# Patient Record
Sex: FEMALE | Race: WHITE | NOT HISPANIC OR LATINO | Employment: OTHER | URBAN - NONMETROPOLITAN AREA
[De-identification: names, ages, dates, MRNs, and addresses within clinical notes are randomized per-mention and may not be internally consistent; named-entity substitution may affect disease eponyms.]

---

## 2017-10-26 ENCOUNTER — HOSPITAL ENCOUNTER (OUTPATIENT)
Dept: ULTRASOUND IMAGING | Facility: HOSPITAL | Age: 77
Discharge: HOME | End: 2017-10-26
Admitting: INTERNAL MEDICINE

## 2017-10-26 DIAGNOSIS — R94.4 ABNORMAL RESULTS OF KIDNEY FUNCTION STUDIES: ICD-10-CM

## 2017-10-26 PROCEDURE — 76775 US EXAM ABDO BACK WALL LIM: CPT

## 2021-01-01 ENCOUNTER — IMMUNIZATION (OUTPATIENT)
Dept: VACCINE CLINIC | Facility: HOSPITAL | Age: 81
End: 2021-01-01

## 2021-01-01 ENCOUNTER — APPOINTMENT (OUTPATIENT)
Dept: CT IMAGING | Facility: HOSPITAL | Age: 81
End: 2021-01-01

## 2021-01-01 ENCOUNTER — APPOINTMENT (OUTPATIENT)
Dept: GENERAL RADIOLOGY | Facility: HOSPITAL | Age: 81
End: 2021-01-01

## 2021-01-01 ENCOUNTER — HOSPITAL ENCOUNTER (INPATIENT)
Facility: HOSPITAL | Age: 81
LOS: 3 days | Discharge: NURSING, REHAB OR LONG-TERM CARE | End: 2021-06-24
Attending: STUDENT IN AN ORGANIZED HEALTH CARE EDUCATION/TRAINING PROGRAM | Admitting: INTERNAL MEDICINE

## 2021-01-01 ENCOUNTER — LAB REQUISITION (OUTPATIENT)
Dept: LAB | Facility: HOSPITAL | Age: 81
End: 2021-01-01

## 2021-01-01 VITALS
OXYGEN SATURATION: 98 % | WEIGHT: 127.8 LBS | HEART RATE: 76 BPM | HEIGHT: 65 IN | SYSTOLIC BLOOD PRESSURE: 124 MMHG | DIASTOLIC BLOOD PRESSURE: 65 MMHG | RESPIRATION RATE: 18 BRPM | TEMPERATURE: 97 F | BODY MASS INDEX: 21.29 KG/M2

## 2021-01-01 DIAGNOSIS — R41.0 CONFUSION: ICD-10-CM

## 2021-01-01 DIAGNOSIS — Z74.09 IMPAIRED MOBILITY AND ADLS: ICD-10-CM

## 2021-01-01 DIAGNOSIS — Z00.00 ENCOUNTER FOR GENERAL ADULT MEDICAL EXAMINATION WITHOUT ABNORMAL FINDINGS: ICD-10-CM

## 2021-01-01 DIAGNOSIS — Z74.09 IMPAIRED FUNCTIONAL MOBILITY, BALANCE, GAIT, AND ENDURANCE: ICD-10-CM

## 2021-01-01 DIAGNOSIS — E87.1 HYPONATREMIA: ICD-10-CM

## 2021-01-01 DIAGNOSIS — Z78.9 IMPAIRED MOBILITY AND ADLS: ICD-10-CM

## 2021-01-01 DIAGNOSIS — R91.8 LUNG MASS: ICD-10-CM

## 2021-01-01 DIAGNOSIS — E83.42 HYPOMAGNESEMIA: Primary | ICD-10-CM

## 2021-01-01 LAB
ALBUMIN SERPL-MCNC: 3.7 G/DL (ref 3.5–5.2)
ALBUMIN/GLOB SERPL: 1.2 G/DL
ALP SERPL-CCNC: 65 U/L (ref 39–117)
ALT SERPL W P-5'-P-CCNC: 11 U/L (ref 1–33)
ANION GAP SERPL CALCULATED.3IONS-SCNC: 10 MMOL/L (ref 5–15)
ANION GAP SERPL CALCULATED.3IONS-SCNC: 12 MMOL/L (ref 5–15)
ANION GAP SERPL CALCULATED.3IONS-SCNC: 14 MMOL/L (ref 5–15)
ANION GAP SERPL CALCULATED.3IONS-SCNC: 8 MMOL/L (ref 5–15)
AST SERPL-CCNC: 17 U/L (ref 1–32)
BACTERIA UR QL AUTO: ABNORMAL /HPF
BASOPHILS # BLD AUTO: 0.05 10*3/MM3 (ref 0–0.2)
BASOPHILS # BLD AUTO: 0.07 10*3/MM3 (ref 0–0.2)
BASOPHILS NFR BLD AUTO: 0.5 % (ref 0–1.5)
BASOPHILS NFR BLD AUTO: 0.6 % (ref 0–1.5)
BASOPHILS NFR BLD AUTO: 0.7 % (ref 0–1.5)
BASOPHILS NFR BLD AUTO: 0.7 % (ref 0–1.5)
BILIRUB SERPL-MCNC: 0.8 MG/DL (ref 0–1.2)
BILIRUB UR QL STRIP: ABNORMAL
BUN SERPL-MCNC: 10 MG/DL (ref 8–23)
BUN SERPL-MCNC: 11 MG/DL (ref 8–23)
BUN SERPL-MCNC: 11 MG/DL (ref 8–23)
BUN SERPL-MCNC: 6 MG/DL (ref 8–23)
BUN/CREAT SERPL: 11.8 (ref 7–25)
BUN/CREAT SERPL: 13.3 (ref 7–25)
BUN/CREAT SERPL: 13.3 (ref 7–25)
BUN/CREAT SERPL: 8.8 (ref 7–25)
CALCIUM SPEC-SCNC: 8.2 MG/DL (ref 8.6–10.5)
CALCIUM SPEC-SCNC: 8.4 MG/DL (ref 8.6–10.5)
CALCIUM SPEC-SCNC: 9.1 MG/DL (ref 8.6–10.5)
CALCIUM SPEC-SCNC: 9.2 MG/DL (ref 8.6–10.5)
CHLORIDE SERPL-SCNC: 80 MMOL/L (ref 98–107)
CHLORIDE SERPL-SCNC: 80 MMOL/L (ref 98–107)
CHLORIDE SERPL-SCNC: 81 MMOL/L (ref 98–107)
CHLORIDE SERPL-SCNC: 85 MMOL/L (ref 98–107)
CLARITY UR: CLEAR
CO2 SERPL-SCNC: 25 MMOL/L (ref 22–29)
CO2 SERPL-SCNC: 26 MMOL/L (ref 22–29)
CO2 SERPL-SCNC: 27 MMOL/L (ref 22–29)
CO2 SERPL-SCNC: 27 MMOL/L (ref 22–29)
COLOR UR: ABNORMAL
CREAT SERPL-MCNC: 0.68 MG/DL (ref 0.57–1)
CREAT SERPL-MCNC: 0.83 MG/DL (ref 0.57–1)
CREAT SERPL-MCNC: 0.83 MG/DL (ref 0.57–1)
CREAT SERPL-MCNC: 0.85 MG/DL (ref 0.57–1)
CYTO UR: NORMAL
CYTO UR: NORMAL
DEPRECATED RDW RBC AUTO: 38.4 FL (ref 37–54)
DEPRECATED RDW RBC AUTO: 38.5 FL (ref 37–54)
DEPRECATED RDW RBC AUTO: 39.2 FL (ref 37–54)
DEPRECATED RDW RBC AUTO: 40.8 FL (ref 37–54)
EOSINOPHIL # BLD AUTO: 0 10*3/MM3 (ref 0–0.4)
EOSINOPHIL # BLD AUTO: 0.02 10*3/MM3 (ref 0–0.4)
EOSINOPHIL # BLD AUTO: 0.07 10*3/MM3 (ref 0–0.4)
EOSINOPHIL # BLD AUTO: 0.2 10*3/MM3 (ref 0–0.4)
EOSINOPHIL NFR BLD AUTO: 0 % (ref 0.3–6.2)
EOSINOPHIL NFR BLD AUTO: 0.2 % (ref 0.3–6.2)
EOSINOPHIL NFR BLD AUTO: 0.9 % (ref 0.3–6.2)
EOSINOPHIL NFR BLD AUTO: 2.1 % (ref 0.3–6.2)
ERYTHROCYTE [DISTWIDTH] IN BLOOD BY AUTOMATED COUNT: 11.9 % (ref 12.3–15.4)
ERYTHROCYTE [DISTWIDTH] IN BLOOD BY AUTOMATED COUNT: 12 % (ref 12.3–15.4)
ERYTHROCYTE [DISTWIDTH] IN BLOOD BY AUTOMATED COUNT: 12.1 % (ref 12.3–15.4)
ERYTHROCYTE [DISTWIDTH] IN BLOOD BY AUTOMATED COUNT: 12.2 % (ref 12.3–15.4)
FLUAV RNA RESP QL NAA+PROBE: NOT DETECTED
FLUBV RNA RESP QL NAA+PROBE: NOT DETECTED
GFR SERPL CREATININE-BSD FRML MDRD: 64 ML/MIN/1.73
GFR SERPL CREATININE-BSD FRML MDRD: 66 ML/MIN/1.73
GFR SERPL CREATININE-BSD FRML MDRD: 66 ML/MIN/1.73
GFR SERPL CREATININE-BSD FRML MDRD: 83 ML/MIN/1.73
GLOBULIN UR ELPH-MCNC: 3 GM/DL
GLUCOSE BLDC GLUCOMTR-MCNC: 109 MG/DL (ref 70–130)
GLUCOSE BLDC GLUCOMTR-MCNC: 112 MG/DL (ref 70–130)
GLUCOSE BLDC GLUCOMTR-MCNC: 126 MG/DL (ref 70–130)
GLUCOSE BLDC GLUCOMTR-MCNC: 129 MG/DL (ref 70–130)
GLUCOSE BLDC GLUCOMTR-MCNC: 137 MG/DL (ref 70–130)
GLUCOSE BLDC GLUCOMTR-MCNC: 152 MG/DL (ref 70–130)
GLUCOSE BLDC GLUCOMTR-MCNC: 171 MG/DL (ref 70–130)
GLUCOSE BLDC GLUCOMTR-MCNC: 202 MG/DL (ref 70–130)
GLUCOSE BLDC GLUCOMTR-MCNC: 256 MG/DL (ref 70–130)
GLUCOSE SERPL-MCNC: 127 MG/DL (ref 65–99)
GLUCOSE SERPL-MCNC: 134 MG/DL (ref 65–99)
GLUCOSE SERPL-MCNC: 160 MG/DL (ref 65–99)
GLUCOSE SERPL-MCNC: 178 MG/DL (ref 65–99)
GLUCOSE UR STRIP-MCNC: NEGATIVE MG/DL
HBA1C MFR BLD: 5.9 % (ref 4.8–5.6)
HCT VFR BLD AUTO: 32.1 % (ref 34–46.6)
HCT VFR BLD AUTO: 34 % (ref 34–46.6)
HCT VFR BLD AUTO: 37 % (ref 34–46.6)
HCT VFR BLD AUTO: 37.8 % (ref 34–46.6)
HGB BLD-MCNC: 11.7 G/DL (ref 12–15.9)
HGB BLD-MCNC: 12 G/DL (ref 12–15.9)
HGB BLD-MCNC: 13.5 G/DL (ref 12–15.9)
HGB BLD-MCNC: 13.6 G/DL (ref 12–15.9)
HGB UR QL STRIP.AUTO: NEGATIVE
HOLD SPECIMEN: NORMAL
HYALINE CASTS UR QL AUTO: ABNORMAL /LPF
IMM GRANULOCYTES # BLD AUTO: 0.02 10*3/MM3 (ref 0–0.05)
IMM GRANULOCYTES # BLD AUTO: 0.03 10*3/MM3 (ref 0–0.05)
IMM GRANULOCYTES # BLD AUTO: 0.03 10*3/MM3 (ref 0–0.05)
IMM GRANULOCYTES # BLD AUTO: 0.04 10*3/MM3 (ref 0–0.05)
IMM GRANULOCYTES NFR BLD AUTO: 0.2 % (ref 0–0.5)
IMM GRANULOCYTES NFR BLD AUTO: 0.3 % (ref 0–0.5)
IMM GRANULOCYTES NFR BLD AUTO: 0.4 % (ref 0–0.5)
IMM GRANULOCYTES NFR BLD AUTO: 0.4 % (ref 0–0.5)
KETONES UR QL STRIP: ABNORMAL
LAB AP CASE REPORT: NORMAL
LAB AP CASE REPORT: NORMAL
LEUKOCYTE ESTERASE UR QL STRIP.AUTO: NEGATIVE
LYMPHOCYTES # BLD AUTO: 1.17 10*3/MM3 (ref 0.7–3.1)
LYMPHOCYTES # BLD AUTO: 1.18 10*3/MM3 (ref 0.7–3.1)
LYMPHOCYTES # BLD AUTO: 2.01 10*3/MM3 (ref 0.7–3.1)
LYMPHOCYTES # BLD AUTO: 2.32 10*3/MM3 (ref 0.7–3.1)
LYMPHOCYTES NFR BLD AUTO: 13 % (ref 19.6–45.3)
LYMPHOCYTES NFR BLD AUTO: 15.5 % (ref 19.6–45.3)
LYMPHOCYTES NFR BLD AUTO: 19.4 % (ref 19.6–45.3)
LYMPHOCYTES NFR BLD AUTO: 24.5 % (ref 19.6–45.3)
Lab: NORMAL
MAGNESIUM SERPL-MCNC: 0.9 MG/DL (ref 1.6–2.4)
MAGNESIUM SERPL-MCNC: 2.9 MG/DL (ref 1.6–2.4)
MCH RBC QN AUTO: 32.2 PG (ref 26.6–33)
MCH RBC QN AUTO: 32.3 PG (ref 26.6–33)
MCH RBC QN AUTO: 32.5 PG (ref 26.6–33)
MCH RBC QN AUTO: 32.5 PG (ref 26.6–33)
MCHC RBC AUTO-ENTMCNC: 35.3 G/DL (ref 31.5–35.7)
MCHC RBC AUTO-ENTMCNC: 36 G/DL (ref 31.5–35.7)
MCHC RBC AUTO-ENTMCNC: 36.4 G/DL (ref 31.5–35.7)
MCHC RBC AUTO-ENTMCNC: 36.5 G/DL (ref 31.5–35.7)
MCV RBC AUTO: 88.9 FL (ref 79–97)
MCV RBC AUTO: 89.2 FL (ref 79–97)
MCV RBC AUTO: 89.4 FL (ref 79–97)
MCV RBC AUTO: 91.4 FL (ref 79–97)
MONOCYTES # BLD AUTO: 0.48 10*3/MM3 (ref 0.1–0.9)
MONOCYTES # BLD AUTO: 0.61 10*3/MM3 (ref 0.1–0.9)
MONOCYTES # BLD AUTO: 0.7 10*3/MM3 (ref 0.1–0.9)
MONOCYTES # BLD AUTO: 0.75 10*3/MM3 (ref 0.1–0.9)
MONOCYTES NFR BLD AUTO: 6.3 % (ref 5–12)
MONOCYTES NFR BLD AUTO: 6.8 % (ref 5–12)
MONOCYTES NFR BLD AUTO: 7.2 % (ref 5–12)
MONOCYTES NFR BLD AUTO: 7.4 % (ref 5–12)
NEUTROPHILS NFR BLD AUTO: 5.82 10*3/MM3 (ref 1.7–7)
NEUTROPHILS NFR BLD AUTO: 6.16 10*3/MM3 (ref 1.7–7)
NEUTROPHILS NFR BLD AUTO: 65 % (ref 42.7–76)
NEUTROPHILS NFR BLD AUTO: 7.18 10*3/MM3 (ref 1.7–7)
NEUTROPHILS NFR BLD AUTO: 7.49 10*3/MM3 (ref 1.7–7)
NEUTROPHILS NFR BLD AUTO: 72.3 % (ref 42.7–76)
NEUTROPHILS NFR BLD AUTO: 76.2 % (ref 42.7–76)
NEUTROPHILS NFR BLD AUTO: 79.4 % (ref 42.7–76)
NITRITE UR QL STRIP: NEGATIVE
NRBC BLD AUTO-RTO: 0 /100 WBC (ref 0–0.2)
OSMOLALITY SERPL: 252 MOSM/KG (ref 280–301)
OSMOLALITY UR: 605 MOSM/KG (ref 38–1400)
PATH REPORT.FINAL DX SPEC: NORMAL
PATH REPORT.FINAL DX SPEC: NORMAL
PATH REPORT.GROSS SPEC: NORMAL
PATH REPORT.GROSS SPEC: NORMAL
PH UR STRIP.AUTO: 6 [PH] (ref 5–9)
PLATELET # BLD AUTO: 319 10*3/MM3 (ref 140–450)
PLATELET # BLD AUTO: 352 10*3/MM3 (ref 140–450)
PLATELET # BLD AUTO: 362 10*3/MM3 (ref 140–450)
PLATELET # BLD AUTO: 397 10*3/MM3 (ref 140–450)
PMV BLD AUTO: 9.1 FL (ref 6–12)
PMV BLD AUTO: 9.2 FL (ref 6–12)
PMV BLD AUTO: 9.3 FL (ref 6–12)
PMV BLD AUTO: 9.6 FL (ref 6–12)
POTASSIUM SERPL-SCNC: 3.3 MMOL/L (ref 3.5–5.2)
POTASSIUM SERPL-SCNC: 3.4 MMOL/L (ref 3.5–5.2)
POTASSIUM SERPL-SCNC: 3.4 MMOL/L (ref 3.5–5.2)
POTASSIUM SERPL-SCNC: 3.7 MMOL/L (ref 3.5–5.2)
POTASSIUM SERPL-SCNC: 4 MMOL/L (ref 3.5–5.2)
POTASSIUM SERPL-SCNC: 4.8 MMOL/L (ref 3.5–5.2)
PROT SERPL-MCNC: 6.7 G/DL (ref 6–8.5)
PROT UR QL STRIP: ABNORMAL
RBC # BLD AUTO: 3.6 10*6/MM3 (ref 3.77–5.28)
RBC # BLD AUTO: 3.72 10*6/MM3 (ref 3.77–5.28)
RBC # BLD AUTO: 4.16 10*6/MM3 (ref 3.77–5.28)
RBC # BLD AUTO: 4.23 10*6/MM3 (ref 3.77–5.28)
RBC # UR: ABNORMAL /HPF
REF LAB TEST METHOD: ABNORMAL
SARS-COV-2 RNA RESP QL NAA+PROBE: NOT DETECTED
SARS-COV-2 RNA RESP QL NAA+PROBE: NOT DETECTED
SODIUM SERPL-SCNC: 115 MMOL/L (ref 136–145)
SODIUM SERPL-SCNC: 118 MMOL/L (ref 136–145)
SODIUM SERPL-SCNC: 119 MMOL/L (ref 136–145)
SODIUM SERPL-SCNC: 120 MMOL/L (ref 136–145)
SODIUM UR-SCNC: 27 MMOL/L
SP GR UR STRIP: 1.02 (ref 1–1.03)
SQUAMOUS #/AREA URNS HPF: ABNORMAL /HPF
TSH SERPL DL<=0.05 MIU/L-ACNC: 1.92 UIU/ML (ref 0.27–4.2)
URATE SERPL-MCNC: 3.3 MG/DL (ref 2.4–5.7)
UROBILINOGEN UR QL STRIP: ABNORMAL
WBC # BLD AUTO: 10.36 10*3/MM3 (ref 3.4–10.8)
WBC # BLD AUTO: 7.63 10*3/MM3 (ref 3.4–10.8)
WBC # BLD AUTO: 9.03 10*3/MM3 (ref 3.4–10.8)
WBC # BLD AUTO: 9.48 10*3/MM3 (ref 3.4–10.8)
WBC UR QL AUTO: ABNORMAL /HPF
WHOLE BLOOD HOLD SPECIMEN: NORMAL

## 2021-01-01 PROCEDURE — 85025 COMPLETE CBC W/AUTO DIFF WBC: CPT | Performed by: INTERNAL MEDICINE

## 2021-01-01 PROCEDURE — 84295 ASSAY OF SERUM SODIUM: CPT | Performed by: INTERNAL MEDICINE

## 2021-01-01 PROCEDURE — 25010000002 LORAZEPAM PER 2 MG: Performed by: NURSE PRACTITIONER

## 2021-01-01 PROCEDURE — 25010000002 IOPAMIDOL 61 % SOLUTION: Performed by: INTERNAL MEDICINE

## 2021-01-01 PROCEDURE — 25010000002 ENOXAPARIN PER 10 MG: Performed by: INTERNAL MEDICINE

## 2021-01-01 PROCEDURE — 63710000001 INSULIN ASPART PER 5 UNITS: Performed by: INTERNAL MEDICINE

## 2021-01-01 PROCEDURE — 91300 HC SARSCOV02 VAC 30MCG/0.3ML IM: CPT | Performed by: NURSE PRACTITIONER

## 2021-01-01 PROCEDURE — 82962 GLUCOSE BLOOD TEST: CPT

## 2021-01-01 PROCEDURE — 81001 URINALYSIS AUTO W/SCOPE: CPT | Performed by: STUDENT IN AN ORGANIZED HEALTH CARE EDUCATION/TRAINING PROGRAM

## 2021-01-01 PROCEDURE — 25010000002 MORPHINE PER 10 MG: Performed by: NURSE PRACTITIONER

## 2021-01-01 PROCEDURE — 99284 EMERGENCY DEPT VISIT MOD MDM: CPT

## 2021-01-01 PROCEDURE — 84132 ASSAY OF SERUM POTASSIUM: CPT | Performed by: NURSE PRACTITIONER

## 2021-01-01 PROCEDURE — 0 DIATRIZOATE MEGLUMINE & SODIUM PER 1 ML: Performed by: INTERNAL MEDICINE

## 2021-01-01 PROCEDURE — 0002A: CPT | Performed by: NURSE PRACTITIONER

## 2021-01-01 PROCEDURE — 25010000002 MAGNESIUM SULFATE 2 GM/50ML SOLUTION: Performed by: INTERNAL MEDICINE

## 2021-01-01 PROCEDURE — 36415 COLL VENOUS BLD VENIPUNCTURE: CPT | Performed by: INTERNAL MEDICINE

## 2021-01-01 PROCEDURE — 71260 CT THORAX DX C+: CPT

## 2021-01-01 PROCEDURE — 88305 TISSUE EXAM BY PATHOLOGIST: CPT | Performed by: SURGERY

## 2021-01-01 PROCEDURE — 83935 ASSAY OF URINE OSMOLALITY: CPT | Performed by: STUDENT IN AN ORGANIZED HEALTH CARE EDUCATION/TRAINING PROGRAM

## 2021-01-01 PROCEDURE — 70450 CT HEAD/BRAIN W/O DYE: CPT

## 2021-01-01 PROCEDURE — 25010000002 MAGNESIUM SULFATE 2 GM/50ML SOLUTION: Performed by: STUDENT IN AN ORGANIZED HEALTH CARE EDUCATION/TRAINING PROGRAM

## 2021-01-01 PROCEDURE — 84443 ASSAY THYROID STIM HORMONE: CPT | Performed by: INTERNAL MEDICINE

## 2021-01-01 PROCEDURE — 99222 1ST HOSP IP/OBS MODERATE 55: CPT | Performed by: INTERNAL MEDICINE

## 2021-01-01 PROCEDURE — 84300 ASSAY OF URINE SODIUM: CPT | Performed by: STUDENT IN AN ORGANIZED HEALTH CARE EDUCATION/TRAINING PROGRAM

## 2021-01-01 PROCEDURE — 91300 HC SARSCOV02 VAC 30MCG/0.3ML IM: CPT | Performed by: THORACIC SURGERY (CARDIOTHORACIC VASCULAR SURGERY)

## 2021-01-01 PROCEDURE — 80048 BASIC METABOLIC PNL TOTAL CA: CPT | Performed by: INTERNAL MEDICINE

## 2021-01-01 PROCEDURE — 74177 CT ABD & PELVIS W/CONTRAST: CPT

## 2021-01-01 PROCEDURE — 97166 OT EVAL MOD COMPLEX 45 MIN: CPT

## 2021-01-01 PROCEDURE — 25810000003 SODIUM CHLORIDE 0.9 % WITH KCL 20 MEQ 20-0.9 MEQ/L-% SOLUTION: Performed by: INTERNAL MEDICINE

## 2021-01-01 PROCEDURE — 0001A: CPT | Performed by: THORACIC SURGERY (CARDIOTHORACIC VASCULAR SURGERY)

## 2021-01-01 PROCEDURE — 80053 COMPREHEN METABOLIC PANEL: CPT | Performed by: STUDENT IN AN ORGANIZED HEALTH CARE EDUCATION/TRAINING PROGRAM

## 2021-01-01 PROCEDURE — 83036 HEMOGLOBIN GLYCOSYLATED A1C: CPT | Performed by: INTERNAL MEDICINE

## 2021-01-01 PROCEDURE — 83735 ASSAY OF MAGNESIUM: CPT | Performed by: INTERNAL MEDICINE

## 2021-01-01 PROCEDURE — 97162 PT EVAL MOD COMPLEX 30 MIN: CPT

## 2021-01-01 PROCEDURE — 83735 ASSAY OF MAGNESIUM: CPT | Performed by: STUDENT IN AN ORGANIZED HEALTH CARE EDUCATION/TRAINING PROGRAM

## 2021-01-01 PROCEDURE — 83930 ASSAY OF BLOOD OSMOLALITY: CPT | Performed by: INTERNAL MEDICINE

## 2021-01-01 PROCEDURE — 87635 SARS-COV-2 COVID-19 AMP PRB: CPT | Performed by: INTERNAL MEDICINE

## 2021-01-01 PROCEDURE — 25010000003 POTASSIUM CHLORIDE 10 MEQ/100ML SOLUTION: Performed by: INTERNAL MEDICINE

## 2021-01-01 PROCEDURE — 85025 COMPLETE CBC W/AUTO DIFF WBC: CPT | Performed by: STUDENT IN AN ORGANIZED HEALTH CARE EDUCATION/TRAINING PROGRAM

## 2021-01-01 PROCEDURE — 87636 SARSCOV2 & INF A&B AMP PRB: CPT | Performed by: STUDENT IN AN ORGANIZED HEALTH CARE EDUCATION/TRAINING PROGRAM

## 2021-01-01 PROCEDURE — 84550 ASSAY OF BLOOD/URIC ACID: CPT | Performed by: INTERNAL MEDICINE

## 2021-01-01 PROCEDURE — 84132 ASSAY OF SERUM POTASSIUM: CPT | Performed by: INTERNAL MEDICINE

## 2021-01-01 PROCEDURE — 84295 ASSAY OF SERUM SODIUM: CPT | Performed by: NURSE PRACTITIONER

## 2021-01-01 PROCEDURE — 71046 X-RAY EXAM CHEST 2 VIEWS: CPT

## 2021-01-01 RX ORDER — ONDANSETRON 2 MG/ML
4 INJECTION INTRAMUSCULAR; INTRAVENOUS EVERY 6 HOURS PRN
Status: DISCONTINUED | OUTPATIENT
Start: 2021-01-01 | End: 2021-01-01 | Stop reason: HOSPADM

## 2021-01-01 RX ORDER — LORAZEPAM 2 MG/ML
1 INJECTION INTRAMUSCULAR ONCE
Status: COMPLETED | OUTPATIENT
Start: 2021-01-01 | End: 2021-01-01

## 2021-01-01 RX ORDER — MORPHINE SULFATE 100 MG/5ML
5 SOLUTION ORAL
Qty: 30 ML | Refills: 0 | Status: SHIPPED | OUTPATIENT
Start: 2021-01-01

## 2021-01-01 RX ORDER — ACETAMINOPHEN 650 MG/1
650 SUPPOSITORY RECTAL EVERY 4 HOURS PRN
Status: DISCONTINUED | OUTPATIENT
Start: 2021-01-01 | End: 2021-01-01 | Stop reason: HOSPADM

## 2021-01-01 RX ORDER — SODIUM CHLORIDE 0.9 % (FLUSH) 0.9 %
10 SYRINGE (ML) INJECTION EVERY 12 HOURS SCHEDULED
Status: DISCONTINUED | OUTPATIENT
Start: 2021-01-01 | End: 2021-01-01

## 2021-01-01 RX ORDER — ACETAMINOPHEN 325 MG/1
650 TABLET ORAL EVERY 4 HOURS PRN
Status: DISCONTINUED | OUTPATIENT
Start: 2021-01-01 | End: 2021-01-01 | Stop reason: HOSPADM

## 2021-01-01 RX ORDER — SODIUM CHLORIDE AND POTASSIUM CHLORIDE 150; 900 MG/100ML; MG/100ML
100 INJECTION, SOLUTION INTRAVENOUS CONTINUOUS
Status: DISCONTINUED | OUTPATIENT
Start: 2021-01-01 | End: 2021-01-01

## 2021-01-01 RX ORDER — SODIUM CHLORIDE 0.9 % (FLUSH) 0.9 %
10 SYRINGE (ML) INJECTION AS NEEDED
Status: DISCONTINUED | OUTPATIENT
Start: 2021-01-01 | End: 2021-01-01

## 2021-01-01 RX ORDER — ONDANSETRON 4 MG/1
4 TABLET, FILM COATED ORAL EVERY 6 HOURS PRN
Status: DISCONTINUED | OUTPATIENT
Start: 2021-01-01 | End: 2021-01-01

## 2021-01-01 RX ORDER — ACETAMINOPHEN 160 MG/5ML
650 SOLUTION ORAL EVERY 4 HOURS PRN
Status: DISCONTINUED | OUTPATIENT
Start: 2021-01-01 | End: 2021-01-01 | Stop reason: HOSPADM

## 2021-01-01 RX ORDER — SODIUM CHLORIDE 0.9 % (FLUSH) 0.9 %
10 SYRINGE (ML) INJECTION AS NEEDED
Status: CANCELLED | OUTPATIENT
Start: 2021-01-01

## 2021-01-01 RX ORDER — POTASSIUM CHLORIDE 7.45 MG/ML
10 INJECTION INTRAVENOUS
Status: DISCONTINUED | OUTPATIENT
Start: 2021-01-01 | End: 2021-01-01

## 2021-01-01 RX ORDER — MAGNESIUM SULFATE HEPTAHYDRATE 40 MG/ML
2 INJECTION, SOLUTION INTRAVENOUS AS NEEDED
Status: DISCONTINUED | OUTPATIENT
Start: 2021-01-01 | End: 2021-01-01

## 2021-01-01 RX ORDER — NICOTINE POLACRILEX 4 MG
15 LOZENGE BUCCAL
Status: DISCONTINUED | OUTPATIENT
Start: 2021-01-01 | End: 2021-01-01

## 2021-01-01 RX ORDER — PANTOPRAZOLE SODIUM 40 MG/1
40 TABLET, DELAYED RELEASE ORAL
Status: DISCONTINUED | OUTPATIENT
Start: 2021-01-01 | End: 2021-01-01

## 2021-01-01 RX ORDER — MORPHINE SULFATE 2 MG/ML
2 INJECTION, SOLUTION INTRAMUSCULAR; INTRAVENOUS
Status: DISCONTINUED | OUTPATIENT
Start: 2021-01-01 | End: 2021-01-01 | Stop reason: HOSPADM

## 2021-01-01 RX ORDER — MAGNESIUM SULFATE HEPTAHYDRATE 40 MG/ML
2 INJECTION, SOLUTION INTRAVENOUS ONCE
Status: COMPLETED | OUTPATIENT
Start: 2021-01-01 | End: 2021-01-01

## 2021-01-01 RX ORDER — LANOLIN ALCOHOL/MO/W.PET/CERES
6 CREAM (GRAM) TOPICAL NIGHTLY
Status: DISCONTINUED | OUTPATIENT
Start: 2021-01-01 | End: 2021-01-01

## 2021-01-01 RX ORDER — LISINOPRIL 40 MG/1
40 TABLET ORAL 2 TIMES DAILY
COMMUNITY
End: 2021-01-01 | Stop reason: HOSPADM

## 2021-01-01 RX ORDER — 3% SODIUM CHLORIDE 3 G/100ML
25 INJECTION, SOLUTION INTRAVENOUS CONTINUOUS
Status: DISCONTINUED | OUTPATIENT
Start: 2021-01-01 | End: 2021-01-01

## 2021-01-01 RX ORDER — POTASSIUM CHLORIDE 1.5 G/1.77G
40 POWDER, FOR SOLUTION ORAL AS NEEDED
Status: DISCONTINUED | OUTPATIENT
Start: 2021-01-01 | End: 2021-01-01

## 2021-01-01 RX ORDER — LORAZEPAM 2 MG/ML
1 CONCENTRATE ORAL
Qty: 30 ML | Refills: 0 | Status: SHIPPED | OUTPATIENT
Start: 2021-01-01

## 2021-01-01 RX ORDER — DIPHENHYDRAMINE HCL 25 MG
25 CAPSULE ORAL NIGHTLY PRN
Status: DISCONTINUED | OUTPATIENT
Start: 2021-01-01 | End: 2021-01-01

## 2021-01-01 RX ORDER — MECLIZINE HYDROCHLORIDE 25 MG/1
25 TABLET ORAL 3 TIMES DAILY PRN
COMMUNITY
End: 2021-01-01 | Stop reason: HOSPADM

## 2021-01-01 RX ORDER — LISINOPRIL 20 MG/1
20 TABLET ORAL
Status: DISCONTINUED | OUTPATIENT
Start: 2021-01-01 | End: 2021-01-01

## 2021-01-01 RX ORDER — AMLODIPINE BESYLATE 10 MG/1
10 TABLET ORAL DAILY
COMMUNITY
End: 2021-01-01 | Stop reason: HOSPADM

## 2021-01-01 RX ORDER — SODIUM CHLORIDE 9 MG/ML
75 INJECTION, SOLUTION INTRAVENOUS CONTINUOUS
Status: DISCONTINUED | OUTPATIENT
Start: 2021-01-01 | End: 2021-01-01

## 2021-01-01 RX ORDER — MAGNESIUM SULFATE HEPTAHYDRATE 40 MG/ML
4 INJECTION, SOLUTION INTRAVENOUS AS NEEDED
Status: DISCONTINUED | OUTPATIENT
Start: 2021-01-01 | End: 2021-01-01

## 2021-01-01 RX ORDER — POTASSIUM CHLORIDE 750 MG/1
40 CAPSULE, EXTENDED RELEASE ORAL AS NEEDED
Status: DISCONTINUED | OUTPATIENT
Start: 2021-01-01 | End: 2021-01-01

## 2021-01-01 RX ORDER — DEXTROSE MONOHYDRATE 25 G/50ML
25 INJECTION, SOLUTION INTRAVENOUS
Status: DISCONTINUED | OUTPATIENT
Start: 2021-01-01 | End: 2021-01-01

## 2021-01-01 RX ORDER — LORAZEPAM 2 MG/ML
1 INJECTION INTRAMUSCULAR
Status: DISCONTINUED | OUTPATIENT
Start: 2021-01-01 | End: 2021-01-01 | Stop reason: HOSPADM

## 2021-01-01 RX ORDER — 3% SODIUM CHLORIDE 3 G/100ML
30 INJECTION, SOLUTION INTRAVENOUS CONTINUOUS
Status: DISCONTINUED | OUTPATIENT
Start: 2021-01-01 | End: 2021-01-01

## 2021-01-01 RX ADMIN — POTASSIUM CHLORIDE 10 MEQ: 7.46 INJECTION, SOLUTION INTRAVENOUS at 20:10

## 2021-01-01 RX ADMIN — PANTOPRAZOLE SODIUM 40 MG: 40 TABLET, DELAYED RELEASE ORAL at 05:12

## 2021-01-01 RX ADMIN — POTASSIUM CHLORIDE 10 MEQ: 7.46 INJECTION, SOLUTION INTRAVENOUS at 21:10

## 2021-01-01 RX ADMIN — POTASSIUM CHLORIDE 40 MEQ: 750 CAPSULE, EXTENDED RELEASE ORAL at 12:15

## 2021-01-01 RX ADMIN — MAGNESIUM SULFATE HEPTAHYDRATE 2 G: 40 INJECTION, SOLUTION INTRAVENOUS at 19:35

## 2021-01-01 RX ADMIN — IOPAMIDOL 90 ML: 612 INJECTION, SOLUTION INTRAVENOUS at 11:55

## 2021-01-01 RX ADMIN — SODIUM CHLORIDE, PRESERVATIVE FREE 10 ML: 5 INJECTION INTRAVENOUS at 15:33

## 2021-01-01 RX ADMIN — MELATONIN 6 MG: at 20:51

## 2021-01-01 RX ADMIN — SODIUM CHLORIDE 75 ML/HR: 900 INJECTION, SOLUTION INTRAVENOUS at 15:31

## 2021-01-01 RX ADMIN — ENOXAPARIN SODIUM 40 MG: 40 INJECTION SUBCUTANEOUS at 14:25

## 2021-01-01 RX ADMIN — LORAZEPAM 1 MG: 2 INJECTION INTRAMUSCULAR; INTRAVENOUS at 14:55

## 2021-01-01 RX ADMIN — SODIUM CHLORIDE, PRESERVATIVE FREE 10 ML: 5 INJECTION INTRAVENOUS at 07:39

## 2021-01-01 RX ADMIN — POTASSIUM CHLORIDE 10 MEQ: 7.46 INJECTION, SOLUTION INTRAVENOUS at 23:47

## 2021-01-01 RX ADMIN — SODIUM CHLORIDE, PRESERVATIVE FREE 10 ML: 5 INJECTION INTRAVENOUS at 12:16

## 2021-01-01 RX ADMIN — LORAZEPAM 1 MG: 2 INJECTION INTRAMUSCULAR; INTRAVENOUS at 17:48

## 2021-01-01 RX ADMIN — MORPHINE SULFATE 2 MG: 2 INJECTION, SOLUTION INTRAMUSCULAR; INTRAVENOUS at 17:39

## 2021-01-01 RX ADMIN — SODIUM CHLORIDE 75 ML/HR: 900 INJECTION, SOLUTION INTRAVENOUS at 13:13

## 2021-01-01 RX ADMIN — IOPAMIDOL 90 ML: 612 INJECTION, SOLUTION INTRAVENOUS at 14:53

## 2021-01-01 RX ADMIN — LISINOPRIL 20 MG: 20 TABLET ORAL at 07:38

## 2021-01-01 RX ADMIN — MORPHINE SULFATE 2 MG: 2 INJECTION, SOLUTION INTRAMUSCULAR; INTRAVENOUS at 10:47

## 2021-01-01 RX ADMIN — SODIUM CHLORIDE 20 ML/HR: 3 INJECTION, SOLUTION INTRAVENOUS at 16:51

## 2021-01-01 RX ADMIN — LORAZEPAM 1 MG: 2 INJECTION INTRAMUSCULAR; INTRAVENOUS at 03:43

## 2021-01-01 RX ADMIN — MAGNESIUM SULFATE HEPTAHYDRATE 2 G: 40 INJECTION, SOLUTION INTRAVENOUS at 17:38

## 2021-01-01 RX ADMIN — LISINOPRIL 20 MG: 20 TABLET ORAL at 08:58

## 2021-01-01 RX ADMIN — INSULIN ASPART 4 UNITS: 100 INJECTION, SOLUTION INTRAVENOUS; SUBCUTANEOUS at 07:38

## 2021-01-01 RX ADMIN — ENOXAPARIN SODIUM 40 MG: 40 INJECTION SUBCUTANEOUS at 15:32

## 2021-01-01 RX ADMIN — POTASSIUM CHLORIDE AND SODIUM CHLORIDE 100 ML/HR: 900; 150 INJECTION, SOLUTION INTRAVENOUS at 12:15

## 2021-01-01 RX ADMIN — DIATRIZOATE MEGLUMINE AND DIATRIZOATE SODIUM 30 ML: 660; 100 LIQUID ORAL; RECTAL at 11:55

## 2021-01-01 RX ADMIN — POTASSIUM CHLORIDE 10 MEQ: 7.46 INJECTION, SOLUTION INTRAVENOUS at 18:49

## 2021-01-01 RX ADMIN — POTASSIUM CHLORIDE 40 MEQ: 750 CAPSULE, EXTENDED RELEASE ORAL at 06:29

## 2021-01-01 RX ADMIN — MAGNESIUM SULFATE HEPTAHYDRATE 2 G: 40 INJECTION, SOLUTION INTRAVENOUS at 11:27

## 2021-01-01 RX ADMIN — MAGNESIUM SULFATE HEPTAHYDRATE 2 G: 40 INJECTION, SOLUTION INTRAVENOUS at 15:31

## 2021-01-01 RX ADMIN — MAGNESIUM SULFATE HEPTAHYDRATE 2 G: 40 INJECTION, SOLUTION INTRAVENOUS at 18:36

## 2021-01-01 RX ADMIN — MORPHINE SULFATE 2 MG: 2 INJECTION, SOLUTION INTRAMUSCULAR; INTRAVENOUS at 03:25

## 2021-06-21 PROBLEM — E83.42 HYPOMAGNESEMIA: Status: ACTIVE | Noted: 2021-01-01

## 2021-06-21 NOTE — H&P
[facility] Medicine Services  INPATIENT HISTORY AND PHYSICAL       Patient Care Team:  Janice Camara APRN as PCP - General (Family Medicine)    Chief complaint   Chief Complaint   Patient presents with   • Altered Mental Status       Subjective     Patient is a 81 y.o. female with history of hypertension, type 2 diabetes mellitus who was brought to the emergency department by his son secondary to progressively worsening functional decline associated with decreased level of consciousness and decreased oral intake.    There is no reported history of fever, chills, nausea, vomiting, chest pain, shortness of air, hematemesis, melena, hematochezia, hemoptysis, dysuria, hematuria, headaches or blurry vision.    On triage, patient was hemodynamically stable.  Noncontrast CT scan of the head did not show any acute changes and blood work showed sodium 119, potassium 3.3 and magnesium of 0.9.  Chest x-ray showed bilateral pulmonary nodules compatible with pulmonary metastasis.    Review of Systems   Constitutional: Positive for activity change, appetite change and fatigue. Negative for chills, diaphoresis and fever.   HENT: Negative for trouble swallowing and voice change.    Eyes: Negative for photophobia and visual disturbance.   Respiratory: Negative for cough, choking, chest tightness, shortness of breath, wheezing and stridor.    Cardiovascular: Negative for chest pain, palpitations and leg swelling.   Gastrointestinal: Negative for abdominal distention, abdominal pain, blood in stool, constipation, diarrhea, nausea and vomiting.   Endocrine: Negative for cold intolerance, heat intolerance, polydipsia, polyphagia and polyuria.   Genitourinary: Negative for decreased urine volume, difficulty urinating, dysuria, enuresis, flank pain, frequency, hematuria and urgency.   Musculoskeletal: Negative for arthralgias, gait problem, myalgias, neck pain and neck stiffness.      Skin: Negative for pallor, rash and wound.   Neurological: Negative for dizziness, tremors, seizures, syncope, facial asymmetry, speech difficulty, weakness, light-headedness, numbness and headaches.   Hematological: Does not bruise/bleed easily.   Psychiatric/Behavioral: Negative for agitation, behavioral problems and confusion.         History  Past Medical History:   Diagnosis Date   • Diabetes mellitus (CMS/HCC)    • Hypertension      History reviewed. No pertinent surgical history.  History reviewed. No pertinent family history.  Social History     Tobacco Use   • Smoking status: Not on file   Substance Use Topics   • Alcohol use: Not on file   • Drug use: Not on file     (Not in a hospital admission)    Allergies:  Patient has no known allergies.  Prior to Admission medications    Medication Sig Start Date End Date Taking? Authorizing Provider   lisinopril (PRINIVIL,ZESTRIL) 40 MG tablet Take 40 mg by mouth Daily.   Yes ProviderAbilio MD   metFORMIN (GLUCOPHAGE) 500 MG tablet Take 500 mg by mouth 2 (Two) Times a Day With Meals.   Yes ProviderAbilio MD       Objective        Vital Signs  Temp:  [97.6 °F (36.4 °C)] 97.6 °F (36.4 °C)  Heart Rate:  [] 100  Resp:  [20] 20  BP: (126-159)/(69-76) 159/76      Physical Exam  Vitals and nursing note reviewed.   Constitutional:       General: She is not in acute distress.     Appearance: She is cachectic. She is not diaphoretic.      Comments: Patient is seen with cachectic/malnourished.     HENT:      Head: Normocephalic and atraumatic.      Right Ear: External ear normal.      Left Ear: External ear normal.      Nose: Nose normal.   Eyes:      Extraocular Movements: Extraocular movements intact.      Conjunctiva/sclera: Conjunctivae normal.      Pupils: Pupils are equal, round, and reactive to light.   Neck:      Thyroid: No thyromegaly.      Vascular: No JVD.      Comments: She has a clean and dry surgical wound on the left neck from a recent  biopsy.  Cardiovascular:      Rate and Rhythm: Normal rate and regular rhythm.      Heart sounds: Normal heart sounds. No murmur heard.   No friction rub. No gallop.    Pulmonary:      Effort: Pulmonary effort is normal. No respiratory distress.      Breath sounds: Normal breath sounds. No stridor. No wheezing or rales.   Chest:      Chest wall: No tenderness.   Abdominal:      General: Bowel sounds are normal. There is no distension.      Palpations: Abdomen is soft. There is no mass.      Tenderness: There is no abdominal tenderness. There is no right CVA tenderness, left CVA tenderness, guarding or rebound.      Hernia: No hernia is present.   Musculoskeletal:         General: No tenderness or deformity. Normal range of motion.      Cervical back: Normal range of motion and neck supple.   Skin:     General: Skin is warm and dry.      Coloration: Skin is not jaundiced or pale.      Findings: No bruising, erythema, lesion or rash.   Neurological:      General: No focal deficit present.      Mental Status: She is alert and oriented to person, place, and time.      Cranial Nerves: No cranial nerve deficit.      Sensory: No sensory deficit.      Motor: No weakness.      Coordination: Coordination normal.      Gait: Gait normal.      Deep Tendon Reflexes: Reflexes are normal and symmetric. Reflexes normal.   Psychiatric:         Mood and Affect: Mood normal.         Behavior: Behavior normal. Behavior is cooperative.         Thought Content: Thought content normal.         Judgment: Judgment normal.           Results Review:     Results from last 7 days   Lab Units 06/21/21  1023   SODIUM mmol/L 119*   POTASSIUM mmol/L 3.7   CHLORIDE mmol/L 80*   CO2 mmol/L 27.0   BUN mg/dL 11   CREATININE mg/dL 0.83   GLUCOSE mg/dL 160*   CALCIUM mg/dL 9.1   BILIRUBIN mg/dL 0.8   ALK PHOS U/L 65   ALT (SGPT) U/L 11   AST (SGOT) U/L 17       Results from last 7 days   Lab Units 06/21/21  1023   MAGNESIUM mg/dL 0.9*       Results from  last 7 days   Lab Units 06/21/21  1023   WBC 10*3/mm3 9.03   HEMOGLOBIN g/dL 13.5   HEMATOCRIT % 37.0   PLATELETS 10*3/mm3 362           Imaging Results (Last 7 Days)     Procedure Component Value Units Date/Time    CT Head Without Contrast [[id number]] Collected: 06/21/21 1046     Updated: 06/21/21 1103    Narrative:        PROCEDURE: CT head without contrast    REASON FOR EXAM: confusion    This exam was performed according to our departmental  dose-optimization program, which includes automated exposure  control, adjustment of the mA and/or kV according to patient size  and/or use of iterative reconstruction technique.    FINDINGS: Axial computer tomography sequential imaging of the  head was performed from the vertex to the base of the skull.  .Sagittal and coronal reformation was performed .    The skull vault is intact. Paranasal sinuses and bilateral  mastoid air cells are well aerated. Mild to moderate cerebral  involutional changes. Bilateral frontal and parietal lobe  periventricular deep white matter small foci of hypodensity.  Cerebral and cerebellar parenchymal are otherwise normal.  Ventricular system and subarachnoid spaces are normal.      Impression:      1.  No acute intracranial abnormality.  2.  Mild to moderate cerebral involutional changes.  3.  Bilateral frontal and parietal lobe periventricular deep  white matter small foci of chronic ischemic gliosis secondary to  microvascular disease.    Electronically signed by:  Allen Franco MD  6/21/2021 11:02 AM CDT  Workstation: [id number]    XR Chest 2 View [[id number]] Collected: 06/21/21 1027     Updated: 06/21/21 1055    Narrative:      EXAM DESCRIPTION:     XR CHEST 2 VW    CLINICAL HISTORY:     81 years  Female  confusion    COMPARISON:     None available    TECHNIQUE:     Two views-PA and lateral radiographs of the chest    FINDINGS:     There are bilateral pulmonary nodules noted the largest on the  right measures 3.2 cm in size. There is  nodularity in the left  infrahilar region with a cluster of nodules noted the largest  measuring approximately 4.4 cm in size. There is probable left  hilar adenopathy mediastinal adenopathy as well. Further  evaluation with CT is strongly recommended.    The bony structures are intact. The cardiac silhouette and  pulmonary vasculature are within normal limits.      Impression:          1. Findings as above compatible with pulmonary metastasis with  probable left hilar and mediastinal adenopathy. Further  evaluation with a contrast-enhanced CT is strongly recommended  for more definitive assessment.        Electronically signed by:  Brooke Rodriguez MD  6/21/2021 10:54 AM  CDT Workstation: [id number]          Assessment / Plan       Hospital Problem List:    Hypomagnesemia    Hypomagnesemia: Begin magnesium repletion protocol.    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  Nephrologist has been consulted.    Hypokalemia: Begin potassium repletion protocol.    Metastatic lung nodules of undetermined primary: Patient will be scheduled for CT scan of the chest followed by biopsy/staging CT of the abdomen and pelvis.  We will consult oncology.    Malnutrition: Consult dietitian.    Diabetes mellitus: Begin Accu-Cheks and sliding scale insulin.  Hold Metformin for now.    Hypertension: Continue lisinopril.    Begin GI and DVT prophylaxis.    Additional orders and treatment plan as hospital dictates.    CODE STATUS was discussed with the patient and she wishes to be full code.      I confirmed that the patient's Advance Care Plan is present, code status is documented, or surrogate decision maker is listed in the patient's medical record.     I have utilized all available immediate resources to obtain, update, or review the patient's current medications    I discussed the patient's findings and my recommendations with patient and his son.          Cash Carlos MD  06/21/21  13:20 CDT      Dictated Utilizing Dragon Dictation

## 2021-06-21 NOTE — PLAN OF CARE
Goal Outcome Evaluation:  Plan of Care Reviewed With: patient        Progress: no change  Outcome Summary: new admit, resting comfortably, no complaints at this time, replacing magnesium

## 2021-06-21 NOTE — ED NOTES
Pt may have onset dementia. Pt was informed by a doctor to come to the ER and get the paperwork going.      Luli aRmos  06/21/21 0922

## 2021-06-21 NOTE — ED PROVIDER NOTES
"Subjective   81-year-old female comes to the ER chief complaint of worsening confusion over the last 3 weeks.  Son says that her confusion is getting worse.  He brought her in to be evaluated.  Patient states she \"is just getting old\".  She feels fine otherwise.  She denies respiratory, abdominal, urinary symptoms.      History provided by:  Patient and relative   used: No        Review of Systems   Constitutional: Negative for activity change, chills and fever.   HENT: Negative for congestion and rhinorrhea.    Respiratory: Negative for cough and shortness of breath.    Cardiovascular: Negative for chest pain and palpitations.   Gastrointestinal: Negative for abdominal pain and nausea.   Genitourinary: Negative for dysuria and flank pain.   Skin: Negative for color change and rash.   Neurological: Negative for dizziness, weakness, light-headedness and headaches.   Psychiatric/Behavioral: Positive for confusion. Negative for agitation. The patient is not nervous/anxious.        Past Medical History:   Diagnosis Date   • Diabetes mellitus (CMS/[facility])    • Hypertension        No Known Allergies    History reviewed. No pertinent surgical history.    History reviewed. No pertinent family history.    Social History     Socioeconomic History   • Marital status:      Spouse name: Not on file   • Number of children: Not on file   • Years of education: Not on file   • Highest education level: Not on file           Objective    Vitals:    06/21/21 0954 06/21/21 1006   BP: 126/69    BP Location: Left arm    Patient Position: Sitting    Pulse: 103    Resp: 20    Temp: 97.6 °F (36.4 °C)    TempSrc: Infrared    SpO2: 96%    Weight:  58.1 kg (128 lb)   Height:  165.1 cm (65\")       Physical Exam  Vitals and nursing note reviewed.   Constitutional:       General: She is not in acute distress.     Appearance: She is well-developed. She is not ill-appearing, toxic-appearing or diaphoretic.   HENT:      Head: " Normocephalic.      Right Ear: External ear normal.      Left Ear: External ear normal.   Pulmonary:      Effort: Pulmonary effort is normal. No accessory muscle usage or respiratory distress.      Breath sounds: No decreased breath sounds or wheezing.   Chest:      Chest wall: No tenderness.   Abdominal:      General: Bowel sounds are normal.      Palpations: Abdomen is soft.      Tenderness: There is no abdominal tenderness (deep palpation).   Skin:     General: Skin is warm and dry.      Capillary Refill: Capillary refill takes less than 2 seconds.   Neurological:      Mental Status: She is alert and oriented to person, place, and time. Mental status is at baseline. She is not disoriented.      GCS: GCS eye subscore is 4. GCS verbal subscore is 5. GCS motor subscore is 6.      Sensory: No sensory deficit.      Motor: No weakness.         Procedures           ED Course      Results for orders placed or performed during the hospital encounter of 06/21/21   Comprehensive Metabolic Panel    Specimen: Blood   Result Value Ref Range    Glucose 160 (H) 65 - 99 mg/dL    BUN 11 8 - 23 mg/dL    Creatinine 0.83 0.57 - 1.00 mg/dL    Sodium 119 (C) 136 - 145 mmol/L    Potassium 3.7 3.5 - 5.2 mmol/L    Chloride 80 (L) 98 - 107 mmol/L    CO2 27.0 22.0 - 29.0 mmol/L    Calcium 9.1 8.6 - 10.5 mg/dL    Total Protein 6.7 6.0 - 8.5 g/dL    Albumin 3.70 3.50 - 5.20 g/dL    ALT (SGPT) 11 1 - 33 U/L    AST (SGOT) 17 1 - 32 U/L    Alkaline Phosphatase 65 39 - 117 U/L    Total Bilirubin 0.8 0.0 - 1.2 mg/dL    eGFR Non African Amer 66 >60 mL/min/1.73    Globulin 3.0 gm/dL    A/G Ratio 1.2 g/dL    BUN/Creatinine Ratio 13.3 7.0 - 25.0    Anion Gap 12.0 5.0 - 15.0 mmol/L   CBC Auto Differential    Specimen: Blood   Result Value Ref Range    WBC 9.03 3.40 - 10.80 10*3/mm3    RBC 4.16 3.77 - 5.28 10*6/mm3    Hemoglobin 13.5 12.0 - 15.9 g/dL    Hematocrit 37.0 34.0 - 46.6 %    MCV 88.9 79.0 - 97.0 fL    MCH 32.5 26.6 - 33.0 pg    MCHC 36.5 (H)  31.5 - 35.7 g/dL    RDW 11.9 (L) 12.3 - 15.4 %    RDW-SD 38.5 37.0 - 54.0 fl    MPV 9.3 6.0 - 12.0 fL    Platelets 362 140 - 450 10*3/mm3    Neutrophil % 79.4 (H) 42.7 - 76.0 %    Lymphocyte % 13.0 (L) 19.6 - 45.3 %    Monocyte % 6.8 5.0 - 12.0 %    Eosinophil % 0.0 (L) 0.3 - 6.2 %    Basophil % 0.6 0.0 - 1.5 %    Immature Grans % 0.2 0.0 - 0.5 %    Neutrophils, Absolute 7.18 (H) 1.70 - 7.00 10*3/mm3    Lymphocytes, Absolute 1.17 0.70 - 3.10 10*3/mm3    Monocytes, Absolute 0.61 0.10 - 0.90 10*3/mm3    Eosinophils, Absolute 0.00 0.00 - 0.40 10*3/mm3    Basophils, Absolute 0.05 0.00 - 0.20 10*3/mm3    Immature Grans, Absolute 0.02 0.00 - 0.05 10*3/mm3    nRBC 0.0 0.0 - 0.2 /100 WBC   Magnesium    Specimen: Blood   Result Value Ref Range    Magnesium 0.9 (C) 1.6 - 2.4 mg/dL   Green Top (Gel)   Result Value Ref Range    Extra Tube Hold for add-ons.    Lavender Top   Result Value Ref Range    Extra Tube hold for add-on    Gold Top - SST   Result Value Ref Range    Extra Tube Hold for add-ons.      CT Head Without Contrast   Final Result   1.  No acute intracranial abnormality.   2.  Mild to moderate cerebral involutional changes.   3.  Bilateral frontal and parietal lobe periventricular deep   white matter small foci of chronic ischemic gliosis secondary to   microvascular disease.      Electronically signed by:  Allen Franco MD  6/21/2021 11:02 AM CDT   Workstation: [id number]      XR Chest 2 View   Final Result         1. Findings as above compatible with pulmonary metastasis with   probable left hilar and mediastinal adenopathy. Further   evaluation with a contrast-enhanced CT is strongly recommended   for more definitive assessment.            Electronically signed by:  Brooke Rodriguez MD  6/21/2021 10:54 AM   CDT Workstation: [id number]      CT Chest With Contrast Diagnostic    (Results Pending)           MDM  Number of Diagnoses or Management Options  Confusion: new and requires workup  Hypomagnesemia: new and  requires workup  Hyponatremia: new and requires workup  Lung mass: new and requires workup  Diagnosis management comments: Vital signs are stable, afebrile.  Patient peers to be alert and oriented x4.  She will easily forget conversations and has repetitive questions.  Labs significant for sodium of 119 and magnesium of 0.9.  CTA negative for acute intracranial abnormalities.  Chest x-ray shows findings concerning for metastatic disease.  CT chest is pending.  Patient received 2 g of magnesium started on normal saline infusion per nephrology recommendations.  On-call hospitalist agrees to admit.      Final diagnoses:   Hypomagnesemia   Confusion   Hyponatremia   Lung mass       ED Disposition  ED Disposition     ED Disposition Condition Comment    Decision to Admit  Level of Care: Telemetry [5]   Diagnosis: Hypomagnesemia [665035]   Admitting Physician: YANETH THOMAS [[id number]]   Attending Physician: YANETH THOMAS [[id number]]   Certification: I Certify That Inpatient Hospital Services Are Medically Necessary For Greater Than 2 Midnights            No follow-up provider specified.       Medication List      No changes were made to your prescriptions during this visit.          Luis M Browning MD  06/21/21 2189

## 2021-06-21 NOTE — CONSULTS
"[facility]  [address] [place] [address]. [address]   - [phone]  F  [phone]     Consultation         PATIENT  DEMOGRAPHICS   PATIENT NAME: Dimple Mansfield                      PHYSICIAN: ROSA Ahumada  : 1940  MRN: [id number]    Subjective   SUBJECTIVE   Referring Provider: Dr. Carlos  Reason for Consultation: Hyponatremia  History of present illness: This is an 81-year-old female with a past medical history significant for DM 2 and hypertension who presented to the hospital with worsening confusion over the past few weeks.  Further evaluation revealed hyponatremia, hypomagnesemia, as well as new lung mass which will need further work-up.  She is being admitted for further treatment and nephrology is consulted to help manage hyponatremia.    Past Medical History:   Diagnosis Date    Diabetes mellitus (CMS/HCC)     Hypertension      History reviewed. No pertinent surgical history.  History reviewed. No pertinent family history.  Social History     Tobacco Use    Smoking status: Not on file   Substance Use Topics    Alcohol use: Not on file    Drug use: Not on file     Allergies:  Patient has no known allergies.     REVIEW OF SYSTEMS    Review of Systems   Unable to perform ROS: Mental status change       Objective   OBJECTIVE   Vital Signs  Temp:  [97.6 °F (36.4 °C)] 97.6 °F (36.4 °C)  Heart Rate:  [] 99  Resp:  [18-20] 18  BP: (126-170)/(69-97) 145/97    Flowsheet Rows        First Filed Value   Admission Height  165.1 cm (65\") Documented at 2021 1006   Admission Weight  58.1 kg (128 lb) Documented at 2021 1006             No intake/output data recorded.    PHYSICAL EXAM    Physical Exam  Constitutional:       Appearance: She is well-developed.   HENT:      Head: Normocephalic and atraumatic.   Eyes:      Conjunctiva/sclera: Conjunctivae normal.      Pupils: Pupils are equal, round, and reactive to light.   Cardiovascular:      Rate and " Rhythm: Normal rate and regular rhythm.   Pulmonary:      Effort: Pulmonary effort is normal.      Breath sounds: Normal breath sounds.   Abdominal:      Palpations: Abdomen is soft.   Musculoskeletal:      Cervical back: Neck supple.      Right lower leg: No edema.      Left lower leg: No edema.   Skin:     General: Skin is warm and dry.   Neurological:      Mental Status: She is alert and oriented to person, place, and time.   Psychiatric:         Mood and Affect: Mood normal.         Behavior: Behavior normal.         RESULTS   Results Review:    Results from last 7 days   Lab Units 06/21/21  1404 06/21/21  1023   SODIUM mmol/L 120* 119*   POTASSIUM mmol/L 3.3* 3.7   CHLORIDE mmol/L 80* 80*   CO2 mmol/L 26.0 27.0   BUN mg/dL 11 11   CREATININE mg/dL 0.83 0.83   CALCIUM mg/dL 9.2 9.1   BILIRUBIN mg/dL  --  0.8   ALK PHOS U/L  --  65   ALT (SGPT) U/L  --  11   AST (SGOT) U/L  --  17   GLUCOSE mg/dL 134* 160*       Estimated Creatinine Clearance: 47.9 mL/min (by C-G formula based on SCr of 0.83 mg/dL).    Results from last 7 days   Lab Units 06/21/21  1023   MAGNESIUM mg/dL 0.9*       Results from last 7 days   Lab Units 06/21/21  1023   URIC ACID mg/dL 3.3       Results from last 7 days   Lab Units 06/21/21  1404 06/21/21  1023   WBC 10*3/mm3 10.36 9.03   HEMOGLOBIN g/dL 13.6 13.5   PLATELETS 10*3/mm3 397 362              MEDICATIONS    enoxaparin, 40 mg, Subcutaneous, Q24H  insulin aspart, 0-7 Units, Subcutaneous, TID AC  sodium chloride, 10 mL, Intravenous, Q12H      sodium chloride, 75 mL/hr, Last Rate: 75 mL/hr (06/21/21 1531)      Medications Prior to Admission   Medication Sig Dispense Refill Last Dose    lisinopril (PRINIVIL,ZESTRIL) 40 MG tablet Take 40 mg by mouth Daily.       metFORMIN (GLUCOPHAGE) 500 MG tablet Take 500 mg by mouth 2 (Two) Times a Day With Meals.        Assessment/Plan   ASSESSMENT / PLAN      Hypomagnesemia    1.  Hyponatremia- sodium down to 119 on arrival and now 120.  Etiology is  likely SIADH as her chest x-ray findings are concerning for metastasis.We will check urine studies, uric acid, TSH.  Continue with normal saline at 75 mL/h for now.  Recheck sodium this afternoon.    2.  Hypomagnesemia- replace    3.  Lung mass- chest x-ray and CT imaging suggestive of metastatic disease, managed per primary team    4.  DM2    5.  Hypertension- well-controlled      Thank you for the consult, we will continue to follow this patient.         I discussed the patients findings and my recommendations with patient      This document has been electronically signed by ROSA Ahumada on June 21, 2021 15:37 CDT      For this patient encounter, I have reviewed the Nurse Practitioner's documentation, medical decision making, and treatment plan and personally spent time with the patient.

## 2021-06-21 NOTE — NURSING NOTE
Patient has a trauma injury ,skinntear , right elbow from a fall. It measures 4 x 2 x 0.1 cm Wound bed a combination of eschar and yellow slough. Needs wound care, offloading and protection. POA yes

## 2021-06-22 PROBLEM — E43 SEVERE MALNUTRITION (HCC): Status: ACTIVE | Noted: 2021-01-01

## 2021-06-22 NOTE — CONSULTS
REASON FOR CONSULTATION: Lung nodule  Provide an opinion on any further workup or treatment                             REQUESTING PHYSICIAN:  Cash Carlos MD    RECORDS OBTAINED:  Records of the patients history including those obtained from the referring provider were reviewed and summarized in detail.      History of Present Illness     This is a 81-year-old female who was seen in consultation at the request of Breanna for evaluation of lung mass.  Patient unable to provide any meaningful history due to mental status changes.  She appears to be quite confused.  No family available at bedside.  I did call patient's son - Mr Hesham Mansfield (independent historian) to obtain history.  As per patient she has past medical history of hypertension, diabetes mellitus and tobacco abuse.  As per son patient used to live by herself and was independent until 3 to 4 weeks ago when he started to notice overall decline in her cognition.  She was unable to eat and has also started to lose weight.  She was brought to our emergency room on June 21, 2021.  In emergency room she had a CT scan of head without contrast which was unremarkable.  Her labs show severe hyponatremia with sodium level of 119.  Patient also chest x-ray which showed concern for bilateral pulmonary nodule.  This was subsequently followed by CT scan of chest which showed arge left mediastinal and hilar mass, right apical lung mass as well as multiple pulmonary nodules.  I been asked to assist with evaluation and management of her lung masses which are likely malignant.        Past Medical History:   Diagnosis Date   • Diabetes mellitus (CMS/HCC)    • Hypertension         History reviewed. No pertinent surgical history.     No current facility-administered medications on file prior to encounter.     Current Outpatient Medications on File Prior to Encounter   Medication Sig Dispense Refill   • lisinopril (PRINIVIL,ZESTRIL) 40 MG tablet Take 40 mg by mouth 2  (two) times a day.     • metFORMIN (GLUCOPHAGE) 500 MG tablet Take 750 mg by mouth 2 (Two) Times a Day With Meals.     • amLODIPine (NORVASC) 10 MG tablet Take 10 mg by mouth Daily.     • meclizine (ANTIVERT) 25 MG tablet Take 25 mg by mouth 3 (Three) Times a Day As Needed for Dizziness.          ALLERGIES:  No Known Allergies     Social History     Socioeconomic History   • Marital status:      Spouse name: Not on file   • Number of children: Not on file   • Years of education: Not on file   • Highest education level: Not on file   Tobacco Use   • Smoking status: Former Smoker   • Smokeless tobacco: Never Used        History reviewed. No pertinent family history.     Review of Systems   Unable to perform ROS: Mental status change            Objective     Vitals:    06/22/21 0532 06/22/21 0802 06/22/21 0856 06/22/21 1220   BP: 127/88  (P) 171/68 171/82   BP Location: Left arm  (P) Left arm Left arm   Patient Position: Lying  (P) Lying Sitting   Pulse: 79 74 (P) 86 88   Resp: 18  (P) 18 18   Temp: 97.3 °F (36.3 °C)   97.3 °F (36.3 °C)   TempSrc: Oral   Oral   SpO2: 96%  (P) 96% 98%   Weight: 57.3 kg (126 lb 4.8 oz)      Height:         No flowsheet data found.    Physical Exam  Vitals and nursing note reviewed.   Constitutional:       Comments: Chronically ill looking cachectic female in no acute distress   HENT:      Mouth/Throat:      Mouth: Mucous membranes are moist.      Pharynx: No oropharyngeal exudate.   Eyes:      General: No scleral icterus.     Extraocular Movements: Extraocular movements intact.      Pupils: Pupils are equal, round, and reactive to light.   Cardiovascular:      Rate and Rhythm: Normal rate and regular rhythm.      Heart sounds: No murmur heard.     Pulmonary:      Effort: Pulmonary effort is normal. No respiratory distress.      Breath sounds: Normal breath sounds. No wheezing.   Abdominal:      General: Bowel sounds are normal. There is no distension.      Palpations: Abdomen is  soft. There is no mass.      Tenderness: There is no abdominal tenderness.   Musculoskeletal:         General: No swelling. Normal range of motion.      Right lower leg: No edema.      Left lower leg: No edema.   Skin:     General: Skin is dry.      Findings: No bruising or erythema.   Neurological:      Comments: Oriented to  place not to time           RECENT LABS:Independently reviewed and summarized  Hematology WBC   Date Value Ref Range Status   06/22/2021 9.48 3.40 - 10.80 10*3/mm3 Final     RBC   Date Value Ref Range Status   06/22/2021 3.60 (L) 3.77 - 5.28 10*6/mm3 Final     Hemoglobin   Date Value Ref Range Status   06/22/2021 11.7 (L) 12.0 - 15.9 g/dL Final     Hematocrit   Date Value Ref Range Status   06/22/2021 32.1 (L) 34.0 - 46.6 % Final     Platelets   Date Value Ref Range Status   06/22/2021 319 140 - 450 10*3/mm3 Final        LAB RESULTS:      Lab 06/22/21  0452 06/21/21  1404 06/21/21  1023   WBC 9.48 10.36 9.03   HEMOGLOBIN 11.7* 13.6 13.5   HEMATOCRIT 32.1* 37.8 37.0   PLATELETS 319 397 362   NEUTROS ABS 6.16 7.49* 7.18*   IMMATURE GRANS (ABS) 0.03 0.04 0.02   LYMPHS ABS 2.32 2.01 1.17   MONOS ABS 0.70 0.75 0.61   EOS ABS 0.20 0.02 0.00   MCV 89.2 89.4 88.9         Lab 06/22/21  1421 06/22/21  0452 06/21/21 2022 06/21/21  1404 06/21/21  1023   SODIUM 118* 118* 118* 120* 119*   POTASSIUM 4.0 3.4*  3.4*  --  3.3* 3.7   CHLORIDE  --  81*  --  80* 80*   CO2  --  27.0  --  26.0 27.0   ANION GAP  --  10.0  --  14.0 12.0   BUN  --  10  --  11 11   CREATININE  --  0.85  --  0.83 0.83   GLUCOSE  --  127*  --  134* 160*   CALCIUM  --  8.2*  --  9.2 9.1   MAGNESIUM  --  2.9*  --   --  0.9*   HEMOGLOBIN A1C  --   --   --  5.90*  --    TSH  --   --   --   --  1.920         Lab 06/21/21  1023   TOTAL PROTEIN 6.7   ALBUMIN 3.70   GLOBULIN 3.0   ALT (SGPT) 11   AST (SGOT) 17   BILIRUBIN 0.8   ALK PHOS 65                     Brief Urine Lab Results  (Last result in the past 365 days)      Color   Clarity    Blood   Leuk Est   Nitrite   Protein   CREAT   Urine HCG        06/21/21 1305 Dark Yellow Clear Negative Negative Negative 100 mg/dL (2+)             Microbiology Results (last 10 days)     Procedure Component Value - Date/Time    COVID-19 and FLU A/B PCR - Swab, Nasopharynx [[id number]]  (Normal) Collected: 06/21/21 1219    Lab Status: Final result Specimen: Swab from Nasopharynx Updated: 06/21/21 1304     COVID19 Not Detected     Influenza A PCR Not Detected     Influenza B PCR Not Detected    Narrative:      Fact sheet for providers: https://www.fda.gov/media/188493/download    Fact sheet for patients: https://www.fda.gov/media/118043/download    Test performed by PCR.          XR Chest 2 View    Result Date: 6/21/2021  EXAM DESCRIPTION:  XR CHEST 2 VW CLINICAL HISTORY: 81 years  Female  confusion COMPARISON: None available TECHNIQUE: Two views-PA and lateral radiographs of the chest FINDINGS: There are bilateral pulmonary nodules noted the largest on the right measures 3.2 cm in size. There is nodularity in the left infrahilar region with a cluster of nodules noted the largest measuring approximately 4.4 cm in size. There is probable left hilar adenopathy mediastinal adenopathy as well. Further evaluation with CT is strongly recommended. The bony structures are intact. The cardiac silhouette and pulmonary vasculature are within normal limits.     1. Findings as above compatible with pulmonary metastasis with probable left hilar and mediastinal adenopathy. Further evaluation with a contrast-enhanced CT is strongly recommended for more definitive assessment. Electronically signed by:  Brooke Rodriguez MD  6/21/2021 10:54 AM CDT Workstation: [id number]    CT Head Without Contrast    Result Date: 6/21/2021  PROCEDURE: CT head without contrast REASON FOR EXAM: confusion This exam was performed according to our departmental dose-optimization program, which includes automated exposure control, adjustment of the mA and/or kV  according to patient size and/or use of iterative reconstruction technique. FINDINGS: Axial computer tomography sequential imaging of the head was performed from the vertex to the base of the skull. .Sagittal and coronal reformation was performed . The skull vault is intact. Paranasal sinuses and bilateral mastoid air cells are well aerated. Mild to moderate cerebral involutional changes. Bilateral frontal and parietal lobe periventricular deep white matter small foci of hypodensity. Cerebral and cerebellar parenchymal are otherwise normal. Ventricular system and subarachnoid spaces are normal.     1.  No acute intracranial abnormality. 2.  Mild to moderate cerebral involutional changes. 3.  Bilateral frontal and parietal lobe periventricular deep white matter small foci of chronic ischemic gliosis secondary to microvascular disease. Electronically signed by:  Allen Franco MD  6/21/2021 11:02 AM CDT Workstation: [id number]    CT Chest With Contrast Diagnostic    Result Date: 6/21/2021  EXAM: CT CHEST WITH IV CONTRAST COMPARISON: Chest radiograph dated same day INDICATION: lung mass, E83.42 Hypomagnesemia R41.0 Disorientation, unspecified E87.1 Hypo-osmolality and hyponatremia R91.8 Other nonspecific abnormal finding of lung field TECHNIQUE: CT images were obtained of the chest after the uneventful administration of iodinated intravenous contrast. Reformats were provided. All CT scans at this facility uses dose modulation, iterative reconstruction, and/or weight-based dosing when appropriate to achieve a radiation dose as low as reasonably achievable. FINDINGS: Lobular right apical lung mass with few spiculated margins that measures 3.0 x 2.1 x 2.9 cm. Within the left upper mediastinum and hilum, there is a large lobular mass that measures 8.6 x 4.8 cm on coronal image 27/80 and up to 9.1 cm in AP dimension on axial image 28/64. The mass completely encases the left upper pulmonary artery. There is narrowing of the  left basilar pulmonary artery. In the left infrahilar region, there is a 4.9 x 4.2 x 4.5 cm mass (coronal image 32/80 and axial image 31/64). Additional part solid nodule is seen in the left apex that measures 2.0 x 2.2 cm best seen on coronal image 41/80. There are multiple additional spiculated nodules in the left upper lobe that measure 2.1 x 0.8 cm (axial image 18/64), 1.1 x 0.9 cm (image 20/64), 1.4 x 0.7 cm (image 21/64), and 5 x 6 mm (21/64). The distal esophagus demonstrates circumferential thickening with appearance of intraluminal mass (axial image 42/64). Heart size is normal. There is coronary artery disease. No pericardial effusion. Cholelithiasis. Bilateral subcentimeter renal hypodensities are too small to characterize, statistically likely cysts. No acute osseous abnormality. Spondylosis and degenerative disc disease of the visualized spine.     Large left mediastinal and hilar mass encasing the left superior basilar pulmonary arteries. Additional right apical lung mass and multiple suspicious nodules in the left upper lobe. Appearance of intraluminal mass in the distal esophagus. Above findings may represent primary and/or metastatic neoplasm such as lung or esophageal cancer, or lymphoma. Coronary artery disease. Cholelithiasis. Electronically signed by:  Thad Coleman MD  6/21/2021 3:30 PM CDT Workstation: [id number]    CT Abdomen Pelvis With Contrast    Result Date: 6/22/2021  PROCEDURE: Ct abdomen and pelvis with contrast REASON FOR EXAM: Adnexal mass, malignancy suspected, E83.42 Hypomagnesemia R41.0 Disorientation, unspecified E87.1 Hypo-osmolality and hyponatremia R91.8 Other nonspecific abnormal finding of lung field FINDINGS: Comparison study dated  June 21, 2021. Axial computer tomography sequential imaging was performed from the diaphragms through the symphysis pubis with IV contrast administration. Sagittal and coronal reformates was performed. This exam was performed according  to our departmental dose optimization program, which includes automated exposure control, adjustment of the mA and/or KV according to patient size and/or use of iterative reconstruction technique. Imaging through the lung bases reveals mild edematous thickening of the distal anterior esophageal walls. The lung bases are otherwise unremarkable. The liver is normal. Multiple varying sized gallstones within the gallbladder. The gallbladder is otherwise unremarkable. The biliary system is normal. The pancreas is normal. The spleen is normal. Mild enlargement of bilateral adrenal glands with irregular borders. The right kidney and ureter are normal. The left kidney and ureter are normal. Abnormal appearance of the bladder with diffuse hyperdense material filling the bladder which has Hounsfield units of 71. Right ovarian enlarged enhancing mass lesion which becomes indistinct with the posterior uterus. This mass measures 5.06 x 6.47 x 4.71 cm. The uterus and left ovary appear within normal limits. The hollow viscera is normal. No lymphadenopathy in the abdomen or the pelvis. No acute osseous abnormality.     1. Distal esophageal mild edematous anterior soft tissue wall thickening. The differential for this finding would include changes from esophagitis versus neoplastic involvement. 2. Cholelithiasis. 3.Mild enlargement of bilateral adrenal glands with irregular borders. The differential for this finding would include metastatic disease versus atypical appearing adrenal adenomas. Recommend clinical correlation. 4.Abnormal appearance of the bladder with diffuse hyperdense material filling the bladder which has Hounsfield units of 71. The differential for this finding would include hemorrhage within the bladder lumen versus mass. 5.Right ovarian enlarged enhancing mass lesion which becomes indistinct with the posterior uterus. This mass measures 5.06 x 6.47 x 4.71 cm. This lesion would be suspicious for primary ovarian  neoplasm versus metastatic disease possibly invading into the uterus. Recommend clinical correlation. 6. Remainder CT abdomen pelvis study with contrast unremarkable. Electronically signed by:  Allen Franco MD  6/22/2021 12:56 PM CDT Workstation: [id number]        Diagnosis:   (1) Lung mass  (2) Ovarian mass  (3) Bladder mass?     Assessment/Plan     81-year-old female admitted with progressive decline in her overall consciousness and failure to thrive.    She had a CT scan of chest performed on admission which showed large left mediastinal and hilar mass, right apical lung mass as well as multiple pulmonary nodules.    In addition, CT scan of abdomen pelvis also showed large right ovarian mass lesion with possible mass in urinary bladder as well.    Patient has severe hyponatremia and unable to provide any meaningful history.  She appears to be quite confused.  No family was available today at bedside.    I discussed with patient's son - Mr Hesham Mansfield about her overall diagnosis, prognosis and need for further work-up on phone at length.    After lengthy discussion patient son tells me that he will talk to her sister and discuss these findings with her.  Likely they are leaning towards comfort measures and not pursuing any further biopsies given her advanced age and frailty which I believe is reasonable.    I will touch base with them tomorrow to discuss further plans.      Recommendations:   - Continue supportive care.   - Touch base with family tomorrow about need for biopsy versus switching her to comfort measures. Son leaning towards later. He will talk his sister.     I gave them office number to call us with any questions.     Thank you for involving me in Mr Mansfield' care.     Please call us if any questions/concerns.     Ian Ruth MD   Hematology Oncology

## 2021-06-22 NOTE — PROGRESS NOTES
"[facility]  [address] [place] [address]. [address]  T - [phone]  F - [phone]     Progress Note          PATIENT  DEMOGRAPHICS   PATIENT NAME: Dimple Mansfield                      PHYSICIAN: ROSA Ahumada  : 1940  MRN: [id number]   LOS: 1 day    Patient Care Team:  Janice Camara APRN as PCP - General (Family Medicine)  Subjective   SUBJECTIVE   Much more alert today.         Objective   OBJECTIVE   Vital Signs  Temp:  [97.3 °F (36.3 °C)-97.8 °F (36.6 °C)] 97.3 °F (36.3 °C)  Heart Rate:  [71-94] 88  Resp:  [18] 18  BP: (127-171)/(82-94) 171/82    Flowsheet Rows      First Filed Value   Admission Height  165.1 cm (65\") Documented at 2021 1006   Admission Weight  58.1 kg (128 lb) Documented at 2021 1006           I/O last 3 completed shifts:  In: 260 [P.O.:160; I.V.:100]  Out: -     PHYSICAL EXAM    Physical Exam  Constitutional:       Appearance: She is well-developed.   HENT:      Head: Normocephalic and atraumatic.   Eyes:      Conjunctiva/sclera: Conjunctivae normal.      Pupils: Pupils are equal, round, and reactive to light.   Cardiovascular:      Rate and Rhythm: Normal rate and regular rhythm.   Pulmonary:      Effort: Pulmonary effort is normal.      Breath sounds: Normal breath sounds.   Abdominal:      Palpations: Abdomen is soft.   Musculoskeletal:      Cervical back: Neck supple.      Right lower leg: No edema.      Left lower leg: No edema.   Skin:     General: Skin is warm and dry.   Neurological:      Mental Status: She is alert. She is disoriented.   Psychiatric:         Mood and Affect: Mood normal.         Behavior: Behavior normal.         RESULTS   Results Review:    Results from last 7 days   Lab Units 21  1421 21  0452 21  1404 21  1023   SODIUM mmol/L 118* 118* 118* 120* 119*   POTASSIUM mmol/L 4.0 3.4*  3.4*  --  3.3* 3.7   CHLORIDE mmol/L  --  81*  --  80* 80*   CO2 mmol/L  --  " 27.0  --  26.0 27.0   BUN mg/dL  --  10  --  11 11   CREATININE mg/dL  --  0.85  --  0.83 0.83   CALCIUM mg/dL  --  8.2*  --  9.2 9.1   BILIRUBIN mg/dL  --   --   --   --  0.8   ALK PHOS U/L  --   --   --   --  65   ALT (SGPT) U/L  --   --   --   --  11   AST (SGOT) U/L  --   --   --   --  17   GLUCOSE mg/dL  --  127*  --  134* 160*       Estimated Creatinine Clearance: 46.2 mL/min (by C-G formula based on SCr of 0.85 mg/dL).    Results from last 7 days   Lab Units 06/22/21  0452 06/21/21  1023   MAGNESIUM mg/dL 2.9* 0.9*       Results from last 7 days   Lab Units 06/21/21  1023   URIC ACID mg/dL 3.3       Results from last 7 days   Lab Units 06/22/21  0452 06/21/21  1404 06/21/21  1023   WBC 10*3/mm3 9.48 10.36 9.03   HEMOGLOBIN g/dL 11.7* 13.6 13.5   PLATELETS 10*3/mm3 319 397 362               Imaging Results (Last 24 Hours)     Procedure Component Value Units Date/Time    CT Abdomen Pelvis With Contrast [[id number]] Collected: 06/22/21 1149     Updated: 06/22/21 1257    Narrative:        PROCEDURE: Ct abdomen and pelvis with contrast    REASON FOR EXAM: Adnexal mass, malignancy suspected, E83.42  Hypomagnesemia R41.0 Disorientation, unspecified E87.1  Hypo-osmolality and hyponatremia R91.8 Other nonspecific abnormal  finding of lung field    FINDINGS: Comparison study dated  June 21, 2021. Axial computer  tomography sequential imaging was performed from the diaphragms  through the symphysis pubis with IV contrast administration.  Sagittal and coronal reformates was performed. This exam was  performed according to our departmental dose optimization  program, which includes automated exposure control, adjustment of  the mA and/or KV according to patient size and/or use of  iterative reconstruction technique.    Imaging through the lung bases reveals mild edematous thickening  of the distal anterior esophageal walls. The lung bases are  otherwise unremarkable. The liver is normal. Multiple varying  sized gallstones  within the gallbladder. The gallbladder is  otherwise unremarkable. The biliary system is normal. The  pancreas is normal. The spleen is normal. Mild enlargement of  bilateral adrenal glands with irregular borders. The right kidney  and ureter are normal. The left kidney and ureter are normal.  Abnormal appearance of the bladder with diffuse hyperdense  material filling the bladder which has Hounsfield units of 71.  Right ovarian enlarged enhancing mass lesion which becomes  indistinct with the posterior uterus. This mass measures 5.06 x  6.47 x 4.71 cm. The uterus and left ovary appear within normal  limits. The hollow viscera is normal. No lymphadenopathy in the  abdomen or the pelvis. No acute osseous abnormality.      Impression:      1. Distal esophageal mild edematous anterior soft tissue wall  thickening. The differential for this finding would include  changes from esophagitis versus neoplastic involvement.  2. Cholelithiasis.  3.Mild enlargement of bilateral adrenal glands with irregular  borders. The differential for this finding would include  metastatic disease versus atypical appearing adrenal adenomas.  Recommend clinical correlation.  4.Abnormal appearance of the bladder with diffuse hyperdense  material filling the bladder which has Hounsfield units of 71.  The differential for this finding would include hemorrhage within  the bladder lumen versus mass.  5.Right ovarian enlarged enhancing mass lesion which becomes  indistinct with the posterior uterus. This mass measures 5.06 x  6.47 x 4.71 cm. This lesion would be suspicious for primary  ovarian neoplasm versus metastatic disease possibly invading into  the uterus. Recommend clinical correlation.   6. Remainder CT abdomen pelvis study with contrast unremarkable.    Electronically signed by:  Allen Franco MD  6/22/2021 12:56 PM CDT  Workstation: [id number]           MEDICATIONS    enoxaparin, 40 mg, Subcutaneous, Q24H  insulin aspart, 0-7 Units,  Subcutaneous, TID AC  lisinopril, 20 mg, Oral, Q24H  [START ON 6/23/2021] pantoprazole, 40 mg, Oral, Q AM  sodium chloride, 10 mL, Intravenous, Q12H      sodium chloride 0.9 % with KCl 20 mEq, 100 mL/hr, Last Rate: 100 mL/hr (06/22/21 1215)        Assessment/Plan   ASSESSMENT / PLAN      Hypomagnesemia    Severe malnutrition (CMS/HCC)    1.  Hyponatremia- sodium down to 119 on arrival and now 120.  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    -Urine Na 27, osmol 605, TSH 1.92, uric acid 3.3    -Not responding to isotonic saline, will add 3% saline. Check na later     2.  Hypomagnesemia- replaced     3.  Lung mass- chest x-ray and CT imaging suggestive of metastatic disease (mediastinal mass with lung mets vs ovarian vs bladder mass), managed per primary team/Oncology     4.  DM2     5.  Hypertension- well-controlled           This document has been electronically signed by ROSA Ahumada on June 22, 2021 15:12 CDT        For this patient encounter, I have reviewed the Nurse Practitioner's documentation, medical decision making, and treatment plan and personally spent time with the patient.

## 2021-06-22 NOTE — PLAN OF CARE
Goal Outcome Evaluation:  Plan of Care Reviewed With: (P) patient           Outcome Summary: (P) vss, no complaits at this time, replacing sodium.

## 2021-06-22 NOTE — CONSULTS
"Adult Nutrition  Assessment    Patient Name:  Dimple Mansfield  YOB: 1940  MRN: [id number]  Admit Date:  6/21/2021    Assessment Date:  6/22/2021    Comments:  82yo female admit w/ AMS, decreased po and functional decline. MD notes test indicates metastatic lung nodules w/ uncertain primary. Noted Na 118L w/ K and Mg also low on admit. Rt elbow wound noted, ?trauma. No PMH other than DM and HTN. Alb 3.7. Cardiac ADA diet- 25% x1 meal. Son states -145# and has lost weight over last few weeks with poor intake. Discussed importance of good protein and general nutrition w/ pt and son and he replied\" thank you but she is likely going to a NH when d/c\". # w/ BMI 21.0- with wt loss of 11%, in a few wks per son- significant. NPFE noted moderate fat loss and muscle wasting. Pt meets criteria for severe, acute malnutrition r/t decreased po and wt loss over last few weeks. RD to follow hospital course.     Reason for Assessment     Row Name 06/22/21 1341          Reason for Assessment    Reason For Assessment  physician consult     Diagnosis  neurologic conditions     Identified At Risk by Screening Criteria  other (see comments);unintentional loss of 10 lbs or more in the past 2 mos;reduced oral intake over the last month MSA         Nutrition/Diet History     Row Name 06/22/21 1343          Nutrition/Diet History    Typical Food/Fluid Intake  Pt states nkfa, she denies difficulty chewing and does not want modification--(son in room states she has some trouble and may do better on chopped meats). She states she eats \"good\" (son states she eats maybe 1 toaster strudel w/ coffee and 1 cheeseburger with fires per day). She was unable to state UBW stating \"I used to be real big, over 200#\". Son states that was years ago and she was maybe 140-145# and has lost weight over last few weeks with poor intake. Discussed importance of good protein and general nutrition w/ pt and son and he replied\" thank " "you but she is likely going to a NH when d/c\"     Food Preferences  No turkey/chicken/fish--likes creamer w/ coffee. Milk only w/ cold cereal, likes Diet Dr Pepper           Labs/Tests/Procedures/Meds     Row Name 06/22/21 1354          Labs/Procedures/Meds    Lab Results Reviewed  reviewed     Lab Results Comments  Glu 127-171, alb 3.7, na 118        Diagnostic Tests/Procedures    Diagnostic Test/Procedure Reviewed  reviewed        Medications    Pertinent Medications Reviewed  reviewed     Pertinent Medications Comments  SSI           Estimated/Assessed Needs     Row Name 06/22/21 1401          Calculation Measurements    Weight Used For Calculations  57.2 kg (126 lb)        Estimated/Assessed Needs    Additional Documentation  Calorie Requirements (Group);KCAL/KG (Group);Protein Requirements (Group);Fluid Requirements (Group)        Calorie Requirements    Estimated Calorie Requirement (kcal/day)  1600        KCAL/KG    KCAL/KG  25 Kcal/Kg (kcal);30 Kcal/Kg (kcal)     25 Kcal/Kg (kcal)  1428.825     30 Kcal/Kg (kcal)  1714.59        Protein Requirements    Weight Used For Protein Calculations  57.2 kg (126 lb)     Est Protein Requirement Amount (gms/kg)  0.8 gm protein     Estimated Protein Requirements (gms/day)  45.72        Fluid Requirements    Fluid Requirements (mL/day)  1400     RDA Method (mL)  1400         Nutrition Prescription Ordered     Row Name 06/22/21 1401          Nutrition Prescription PO    Current PO Diet  Regular     Common Modifiers  Cardiac;Consistent Carbohydrate         Evaluation of Received Nutrient/Fluid Intake     Row Name 06/22/21 1402          PO Evaluation    Number of Meals  1     % PO Intake  25           Malnutrition Severity Assessment     Row Name 06/22/21 1402          Malnutrition Severity Assessment    Malnutrition Type  Acute Disease or Injury - Related Malnutrition        Insufficient Energy Intake     Insufficient Energy Intake Findings  Moderate        Unintentional " "Weight Loss     Unintentional Weight Loss Findings  Severe     Unintentional Weight Loss   -- -11% in \"few weeks\" per son        Muscle Loss    Loss of Muscle Mass Findings  Moderate     Jain Region  Moderate - slight depression     Clavicle Bone Region  Moderate - some protrusion in females, visible in males     Acromion Bone Region  Moderate - acromion may slightly protrude     Scapular Bone Region  Moderate - mild depression, bones may show slightly     Dorsal Hand Region  Severe - prominent depression     Patellar Region  Moderate - patella more prominent, less muscle definition around patella     Anterior Thigh Region  Moderate - mild depression on inner thigh     Posterior Calf Region  Moderate - some roundness, slight firmness        Fat Loss    Subcutaneous Fat Loss Findings  Moderate     Orbital Region   Moderate -  somewhat hollowness, slightly dark circles     Upper Arm Region  Severe - mostly skin, very little space between folds, fingers touch     Thoracic & Lumbar Region  None        Criteria Met (Must meet criteria for severity in at least 2 of these categories: M Wasting, Fat Loss, Fluid, Secondary Signs, Wt. Status, Intake)    Patient meets criteria for   Severe Malnutrition           Electronically signed by:  Kristan Good RD  06/22/21 14:06 CDT  "

## 2021-06-22 NOTE — PLAN OF CARE
Problem: Adult Inpatient Plan of Care  Goal: Plan of Care Review  Outcome: Ongoing, Progressing  Flowsheets (Taken 6/22/2021 1420)  Plan of Care Reviewed With:   patient   son   Goal Outcome Evaluation:  Plan of Care Reviewed With: patient, son      MD notes test indicates metastatic lung nodules w/ uncertain primary. Noted Na 118L. Rt elbow wound noted, ?trauma. Alb 3.7. Cardiac ADA diet- 25% x1 meal. Son states -145# and has lost weight over last few weeks with poor intake. # w/ BMI 21.0- with wt loss of 11%, in a few wks per son- significant. NPFE noted moderate fat loss and muscle wasting. Pt meets criteria for severe, acute malnutrition r/t decreased po and wt loss over last few weeks. RD following

## 2021-06-22 NOTE — PROGRESS NOTES
[facility] Medicine Services  INPATIENT PROGRESS NOTE    Length of Stay: 1  Date of Admission: 6/21/2021  Primary Care Physician: Janice Camara APRN    Subjective   Chief Complaint: No complaints    HPI:      6/22/2021:  Patient has no complaints today.      H&P by Dr. Carlos: Patient is a 81 y.o. female with history of hypertension, type 2 diabetes mellitus who was brought to the emergency department by his son secondary to progressively worsening functional decline associated with decreased level of consciousness and decreased oral intake.    There is no reported history of fever, chills, nausea, vomiting, chest pain, shortness of air, hematemesis, melena, hematochezia, hemoptysis, dysuria, hematuria, headaches or blurry vision.     On triage, patient was hemodynamically stable.  Noncontrast CT scan of the head did not show any acute changes and blood work showed sodium 119, potassium 3.3 and magnesium of 0.9.  Chest x-ray showed bilateral pulmonary nodules compatible with pulmonary metastasis.    Review of Systems   Constitutional: Negative for activity change and fatigue.   HENT: Negative for ear pain and sore throat.    Eyes: Negative for pain and discharge.   Respiratory: Negative for cough and shortness of breath.    Cardiovascular: Negative for chest pain and palpitations.   Gastrointestinal: Negative for abdominal pain and nausea.   Endocrine: Negative for cold intolerance and heat intolerance.   Genitourinary: Negative for difficulty urinating and dysuria.   Musculoskeletal: Negative for arthralgias and gait problem.   Skin: Negative for color change and rash.   Neurological: Negative for dizziness and weakness.   Psychiatric/Behavioral: Negative for agitation and confusion.        Objective    Temp:  [97.3 °F (36.3 °C)-97.8 °F (36.6 °C)] 97.3 °F (36.3 °C)  Heart Rate:  [71-94] 88  Resp:  [18] 18  BP: (127-171)/(82-94) 171/82    Physical  Exam  Constitutional:       Appearance: She is well-developed.   HENT:      Head: Normocephalic and atraumatic.   Eyes:      Pupils: Pupils are equal, round, and reactive to light.   Cardiovascular:      Rate and Rhythm: Normal rate and regular rhythm.   Pulmonary:      Effort: Pulmonary effort is normal.      Breath sounds: Normal breath sounds.   Abdominal:      General: Bowel sounds are normal.      Palpations: Abdomen is soft.   Musculoskeletal:         General: Normal range of motion.      Cervical back: Normal range of motion and neck supple.   Skin:     General: Skin is warm and dry.   Neurological:      Mental Status: She is alert and oriented to person, place, and time.   Psychiatric:         Behavior: Behavior normal.         Results Review:  I have reviewed the labs, radiology results, and diagnostic studies.    Laboratory Data:   Results from last 7 days   Lab Units 06/22/21 0452 06/21/21 2022 06/21/21  1404 06/21/21  1023   SODIUM mmol/L 118* 118* 120* 119*   POTASSIUM mmol/L 3.4*  3.4*  --  3.3* 3.7   CHLORIDE mmol/L 81*  --  80* 80*   CO2 mmol/L 27.0  --  26.0 27.0   BUN mg/dL 10  --  11 11   CREATININE mg/dL 0.85  --  0.83 0.83   GLUCOSE mg/dL 127*  --  134* 160*   CALCIUM mg/dL 8.2*  --  9.2 9.1   BILIRUBIN mg/dL  --   --   --  0.8   ALK PHOS U/L  --   --   --  65   ALT (SGPT) U/L  --   --   --  11   AST (SGOT) U/L  --   --   --  17   ANION GAP mmol/L 10.0  --  14.0 12.0     Estimated Creatinine Clearance: 46.2 mL/min (by C-G formula based on SCr of 0.85 mg/dL).  Results from last 7 days   Lab Units 06/22/21  0452 06/21/21  1023   MAGNESIUM mg/dL 2.9* 0.9*     Results from last 7 days   Lab Units 06/21/21  1023   URIC ACID mg/dL 3.3     Results from last 7 days   Lab Units 06/22/21 0452 06/21/21  1404 06/21/21  1023   WBC 10*3/mm3 9.48 10.36 9.03   HEMOGLOBIN g/dL 11.7* 13.6 13.5   HEMATOCRIT % 32.1* 37.8 37.0   PLATELETS 10*3/mm3 319 397 362           Culture Data:   No results found for:  BLOODCX  No results found for: URINECX  No results found for: RESPCX  No results found for: WOUNDCX  No results found for: STOOLCX  No components found for: BODYFLD    Radiology Data:   Imaging Results (Last 24 Hours)     Procedure Component Value Units Date/Time    CT Abdomen Pelvis With Contrast [[id number]] Collected: 06/22/21 1149     Updated: 06/22/21 1257    Narrative:        PROCEDURE: Ct abdomen and pelvis with contrast    REASON FOR EXAM: Adnexal mass, malignancy suspected, E83.42  Hypomagnesemia R41.0 Disorientation, unspecified E87.1  Hypo-osmolality and hyponatremia R91.8 Other nonspecific abnormal  finding of lung field    FINDINGS: Comparison study dated  June 21, 2021. Axial computer  tomography sequential imaging was performed from the diaphragms  through the symphysis pubis with IV contrast administration.  Sagittal and coronal reformates was performed. This exam was  performed according to our departmental dose optimization  program, which includes automated exposure control, adjustment of  the mA and/or KV according to patient size and/or use of  iterative reconstruction technique.    Imaging through the lung bases reveals mild edematous thickening  of the distal anterior esophageal walls. The lung bases are  otherwise unremarkable. The liver is normal. Multiple varying  sized gallstones within the gallbladder. The gallbladder is  otherwise unremarkable. The biliary system is normal. The  pancreas is normal. The spleen is normal. Mild enlargement of  bilateral adrenal glands with irregular borders. The right kidney  and ureter are normal. The left kidney and ureter are normal.  Abnormal appearance of the bladder with diffuse hyperdense  material filling the bladder which has Hounsfield units of 71.  Right ovarian enlarged enhancing mass lesion which becomes  indistinct with the posterior uterus. This mass measures 5.06 x  6.47 x 4.71 cm. The uterus and left ovary appear within normal  limits. The  hollow viscera is normal. No lymphadenopathy in the  abdomen or the pelvis. No acute osseous abnormality.      Impression:      1. Distal esophageal mild edematous anterior soft tissue wall  thickening. The differential for this finding would include  changes from esophagitis versus neoplastic involvement.  2. Cholelithiasis.  3.Mild enlargement of bilateral adrenal glands with irregular  borders. The differential for this finding would include  metastatic disease versus atypical appearing adrenal adenomas.  Recommend clinical correlation.  4.Abnormal appearance of the bladder with diffuse hyperdense  material filling the bladder which has Hounsfield units of 71.  The differential for this finding would include hemorrhage within  the bladder lumen versus mass.  5.Right ovarian enlarged enhancing mass lesion which becomes  indistinct with the posterior uterus. This mass measures 5.06 x  6.47 x 4.71 cm. This lesion would be suspicious for primary  ovarian neoplasm versus metastatic disease possibly invading into  the uterus. Recommend clinical correlation.   6. Remainder CT abdomen pelvis study with contrast unremarkable.    Electronically signed by:  Allen Franco MD  6/22/2021 12:56 PM CDT  Workstation: [id number]          I have reviewed the patient's current medications.     Assessment/Plan     Active Hospital Problems    Diagnosis    • Hypomagnesemia        Plan:      1.  Hypomagnesemia, resolved: Magnesium repletion protocol in place.  2.  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  Nephrology consult appreciated.    3.  Hypokalemia: Potassium repletion protocol in place.  4.  Metastatic lung nodules of undetermined primary: Oncology consult appreciated.   5.  Severe malnutrition: Dietitian consult appreciated.  6.  Diabetes mellitus: Begin Accu-Cheks and sliding scale insulin.  Hold Metformin for now.  7.  Hypertension: Continue lisinopril.     DVT prophylaxis: Lovenox.  GI prophylaxis:    Discharge  Planning: I expect patient to be discharged to home in 2-3 days.    I confirmed that the patient's Advance Care Plan is present, code status is documented, or surrogate decision maker is listed in the patient's medical record.      I have utilized all available immediate resources to obtain, update, or review the patient's current medications.         This document has been electronically signed by ROSA Reid on June 22, 2021 14:03 CDT

## 2021-06-23 NOTE — PLAN OF CARE
Goal Outcome Evaluation:  Plan of Care Reviewed With: patient  Progress: no change  Outcome Summary: vss, pt continues jumping out of bed, educated on use of call light to prevent falls when out of bed, md paged, orders placed, adequate urine output, will cont to monitor

## 2021-06-23 NOTE — THERAPY EVALUATION
Patient Name: Dimple Mansfield  : 1940    MRN: [id number]                              Today's Date: 2021       Admit Date: 2021    Visit Dx:     ICD-10-CM ICD-9-CM   1. Hypomagnesemia  E83.42 275.2   2. Confusion  R41.0 298.9   3. Hyponatremia  E87.1 276.1   4. Lung mass  R91.8 786.6   5. Impaired mobility and ADLs  Z74.09 V49.89    Z78.9      Patient Active Problem List   Diagnosis   • Hypomagnesemia   • Severe malnutrition (CMS/HCC)     Past Medical History:   Diagnosis Date   • Diabetes mellitus (CMS/HCC)    • Hypertension      History reviewed. No pertinent surgical history.  General Information     Row Name 21 1029          OT Time and Intention    Document Type  evaluation  -AS     Mode of Treatment  co-treatment;physical therapy;occupational therapy  -AS     Row Name 21 1029          General Information    Patient Profile Reviewed  yes  -AS     Prior Level of Function  independent:;ADL's;all household mobility;transfer;cooking;cleaning;driving;shopping  -AS     Existing Precautions/Restrictions  fall  -AS     Barriers to Rehab  cognitive status  -AS     Row Name 21 1029          Living Environment    Lives With  alone  -AS     Row Name 21 1029          Home Main Entrance    Number of Stairs, Main Entrance  none  -AS     Row Name 21 1029          Stairs Within Home, Primary    Number of Stairs, Within Home, Primary  none  -AS     Stairs Comment, Within Home, Primary  does not have/use any DME; tub/shower combo  -AS     Row Name 21 1029          Cognition    Orientation Status (Cognition)  oriented x 4  -AS     Row Name 21 1029          Safety Issues, Functional Mobility    Safety Issues Affecting Function (Mobility)  safety precautions follow-through/compliance;safety precaution awareness;impulsivity;judgment;insight into deficits/self-awareness;problem-solving  -AS     Impairments Affecting Function (Mobility)  balance;endurance/activity  tolerance;strength  -AS       User Key  (r) = Recorded By, (t) = Taken By, (c) = Cosigned By    Initials Name Provider Type    AS Amanda Hernandez OT Occupational Therapist          Mobility/ADL's     Row Name 06/23/21 1029          Bed Mobility    Comment (Bed Mobility)  rec'd seated up in chair  -AS     Row Name 06/23/21 1029          Transfers    Transfers  sit-stand transfer;toilet transfer  -AS     Comment (Transfers)  max vc for safely managing RW  -AS     Bed-Chair [place] (Transfers)  contact guard  -AS     Sit-Stand [place] (Transfers)  contact guard  -AS     [place] Level (Toilet Transfer)  contact guard  -AS     Assistive Device (Toilet Transfer)  walker, front-wheeled;raised toilet seat  -AS     Row Name 06/23/21 1029          Sit-Stand Transfer    Assistive Device (Sit-Stand Transfers)  walker, front-wheeled  -AS     Row Name 06/23/21 1029          Toilet Transfer    Type (Toilet Transfer)  sit-stand;stand-sit  -AS     Row Name 06/23/21 1029          Functional Mobility    Functional Mobility- Ind. Level  contact guard assist  -AS     Functional Mobility- Device  rolling walker  -AS     Row Name 06/23/21 1029          Activities of Daily Living    BADL Assessment/Intervention  toileting  -AS     Row Name 06/23/21 1029          Toileting Assessment/Training    [place] Level (Toileting)  contact guard assist  -AS     Assistive Devices (Toileting)  raised toilet seat  -AS     Position (Toileting)  unsupported sitting;supported standing  -AS       User Key  (r) = Recorded By, (t) = Taken By, (c) = Cosigned By    Initials Name Provider Type    AS Amanda Hernandez OT Occupational Therapist        Obj/Interventions     Row Name 06/23/21 1029          Sensory Assessment (Somatosensory)    Sensory Assessment (Somatosensory)  UE sensation intact  -AS     Row Name 06/23/21 1029          Range of Motion Comprehensive    General Range of Motion  bilateral upper extremity ROM WFL  -AS     Row  Name 06/23/21 1029          Strength Comprehensive (MMT)    Comment, General Manual Muscle Testing (MMT) Assessment  grossly 3+/5 throughout  -AS       User Key  (r) = Recorded By, (t) = Taken By, (c) = Cosigned By    Initials Name Provider Type    AS Amanda Hernandez OT Occupational Therapist        Goals/Plan     Row Name 06/23/21 1029          Transfer Goal 1 (OT)    Activity/Assistive Device (Transfer Goal 1, OT)  sit-to-stand/stand-to-sit;bed-to-chair/chair-to-bed;toilet  -AS     [place] Level/Cues Needed (Transfer Goal 1, OT)  supervision required  -AS     Time Frame (Transfer Goal 1, OT)  long term goal (LTG);by discharge  -AS     Progress/Outcome (Transfer Goal 1, OT)  goal not met  -AS     [facility] Name 06/23/21 1029          Bathing Goal 1 (OT)    Activity/Device (Bathing Goal 1, OT)  bathing skills, all  -AS     [place] Level/Cues Needed (Bathing Goal 1, OT)  supervision required;set-up required  -AS     Time Frame (Bathing Goal 1, OT)  long term goal (LTG);by discharge  -AS     Progress/Outcomes (Bathing Goal 1, OT)  goal not met  -AS     [facility] Name 06/23/21 1029          Dressing Goal 1 (OT)    Activity/Device (Dressing Goal 1, OT)  dressing skills, all  -AS     [place]/Cues Needed (Dressing Goal 1, OT)  supervision required;set-up required  -AS     Time Frame (Dressing Goal 1, OT)  long term goal (LTG);by discharge  -AS     Progress/Outcome (Dressing Goal 1, OT)  goal not met  -AS     [facility] Name 06/23/21 1029          Toileting Goal 1 (OT)    Activity/Device (Toileting Goal 1, OT)  toileting skills, all  -AS     [place] Level/Cues Needed (Toileting Goal 1, OT)  modified independence  -AS     Time Frame (Toileting Goal 1, OT)  long term goal (LTG);by discharge  -AS     Progress/Outcome (Toileting Goal 1, OT)  goal not met  -AS     [facility] Name 06/23/21 1029          Therapy Assessment/Plan (OT)    Planned Therapy Interventions (OT)  activity tolerance training;functional balance  retraining;occupation/activity based interventions;adaptive equipment training;ROM/therapeutic exercise;strengthening exercise;transfer/mobility retraining;BADL retraining;patient/caregiver education/training  -AS       User Key  (r) = Recorded By, (t) = Taken By, (c) = Cosigned By    Initials Name Provider Type    AS Amanda Hernandez, OT Occupational Therapist        Clinical Impression     Row Name 06/23/21 1029          Pain Assessment    Additional Documentation  Pain Scale: Numbers Pre/Post-Treatment (Group)  -AS     Row Name 06/23/21 1029          Pain Scale: Numbers Pre/Post-Treatment    Pretreatment Pain Rating  0/10 - no pain  -AS     Posttreatment Pain Rating  0/10 - no pain  -AS     Row Name 06/23/21 1029          Plan of Care Review    Plan of Care Reviewed With  patient  -AS     Row Name 06/23/21 1029          Therapy Assessment/Plan (OT)    Patient/Family Therapy Goal Statement (OT)  return home  -AS     Rehab Potential (OT)  good, to achieve stated therapy goals  -AS     Criteria for Skilled Therapeutic Interventions Met (OT)  yes;meets criteria;skilled treatment is necessary  -AS     Therapy Frequency (OT)  other (see comments) 5-7days/wk  -AS     Predicted Duration of Therapy Intervention (OT)  until goals met or d/c from facility  -AS     Row Name 06/23/21 1029          Therapy Plan Review/Discharge Plan (OT)    Anticipated Discharge Disposition (OT)  home with assist;home with home health  -AS     Row Name 06/23/21 1029          Vital Signs    Pre Systolic BP Rehab  161  -AS     Pre Treatment Diastolic BP  99  -AS     Post Systolic BP Rehab  169  -AS     Post Treatment Diastolic BP  91  -AS     Pretreatment Heart Rate (beats/min)  79  -AS     Posttreatment Heart Rate (beats/min)  99  -AS     Pre SpO2 (%)  96  -AS     O2 Delivery Pre Treatment  room air  -AS     Post SpO2 (%)  92  -AS     Pre Patient Position  Sitting  -AS     Post Patient Position  Sitting  -AS     Row Name 06/23/21 1029           Positioning and Restraints    Pre-Treatment Position  sitting in chair/recliner  -AS     Post Treatment Position  chair  -AS     In Chair  notified nsg;sitting;call light within reach;encouraged to call for assist;exit alarm on  -AS       User Key  (r) = Recorded By, (t) = Taken By, (c) = Cosigned By    Initials Name Provider Type    AS Amanda Hernandez, OT Occupational Therapist        Outcome Measures     Row Name 06/23/21 1029          How much help from another is currently needed...    Putting on and taking off regular lower body clothing?  3  -AS     Bathing (including washing, rinsing, and drying)  3  -AS     Toileting (which includes using toilet bed pan or urinal)  3  -AS     Putting on and taking off regular upper body clothing  3  -AS     Taking care of personal grooming (such as brushing teeth)  3  -AS     Eating meals  4  -AS     AM-PAC 6 Clicks Score (OT)  19  -AS     Row Name 06/23/21 1028          How much help from another person do you currently need...    Turning from your back to your side while in flat bed without using bedrails?  3  -CZ     Moving from lying on back to sitting on the side of a flat bed without bedrails?  3  -CZ     Moving to and from a bed to a chair (including a wheelchair)?  3  -CZ     Standing up from a chair using your arms (e.g., wheelchair, bedside chair)?  3  -CZ     Climbing 3-5 steps with a railing?  3  -CZ     To walk in hospital room?  3  -CZ     AM-PAC 6 Clicks Score (PT)  18  -CZ     Row Name 06/23/21 1029 06/23/21 1028       Functional Assessment    Outcome Measure Options  AM-PAC 6 Clicks Daily Activity (OT)  -AS  AM-PAC 6 Clicks Basic Mobility (PT);[place]  -CZ      User Key  (r) = Recorded By, (t) = Taken By, (c) = Cosigned By    Initials Name Provider Type    Uday Noland, PT Physical Therapist    AS Amanda Hernandez OT Occupational Therapist        Occupational Therapy Education                 Title: PT OT SLP Therapies (In Progress)     Topic:  Occupational Therapy (In Progress)     Point: ADL training (In Progress)     Description:   Instruct learner(s) on proper safety adaptation and remediation techniques during self care or transfers.   Instruct in proper use of assistive devices.              Learning Progress Summary           Patient Acceptance, E, NR by AS at 6/23/2021 1327    Comment: role of OT, OT POC, ADL training, transfer training, safety precautions, fall precautions                   Point: Home exercise program (Not Started)     Description:   Instruct learner(s) on appropriate technique for monitoring, assisting and/or progressing therapeutic exercises/activities.              Learner Progress:  Not documented in this visit.          Point: Precautions (In Progress)     Description:   Instruct learner(s) on prescribed precautions during self-care and functional transfers.              Learning Progress Summary           Patient Acceptance, E, NR by AS at 6/23/2021 1327    Comment: role of OT, OT POC, ADL training, transfer training, safety precautions, fall precautions                   Point: Body mechanics (Not Started)     Description:   Instruct learner(s) on proper positioning and spine alignment during self-care, functional mobility activities and/or exercises.              Learner Progress:  Not documented in this visit.                      User Key     Initials Effective Dates Name Provider Type Discipline    AS 03/18/21 -  Amanda Hernandez OT Occupational Therapist OT              OT Recommendation and Plan  Planned Therapy Interventions (OT): activity tolerance training, functional balance retraining, occupation/activity based interventions, adaptive equipment training, ROM/therapeutic exercise, strengthening exercise, transfer/mobility retraining, BADL retraining, patient/caregiver education/training  Therapy Frequency (OT): other (see comments) (5-7days/wk)  Plan of Care Review  Plan of Care Reviewed With: patient  Outcome  Summary: OT eval completed; co-eval with PT. Pt agreeable and cooperative. Pt performed transfers, ambulation, and toileting tasks with CGA. Due to unsteadiness without use of an AD, pt instructed in use of RW. Pt required max vc to safely manage walker during ambulation and functional task. Pt is very impulsive with poor safety awareness. Pt demo decreased short term memory and required frequent repetition of fall precautions and using call bell before getting up. Pt would benefit from continued skilled OT services to increased safety and independence in ADLs. Rec home with assist (due to safety concerns) and HH OT.     Time Calculation:   Time Calculation- OT     Row Name 06/23/21 1336             Time Calculation- OT    OT Start Time  1029  -AS      OT Stop Time  1100  -AS      OT Time Calculation (min)  31 min  -AS      OT Received On  06/23/21  -AS      OT Goal Re-Cert Due Date  07/06/21  -AS         Untimed Charges    OT Eval/Re-eval Minutes  31  -AS         Total Minutes    Untimed Charges Total Minutes  31  -AS       Total Minutes  31  -AS        User Key  (r) = Recorded By, (t) = Taken By, (c) = Cosigned By    Initials Name Provider Type    AS Amanda Hernandez OT Occupational Therapist        Therapy Charges for Today     Code Description Service Date Service Provider Modifiers Qty    [id number]  OT EVAL MOD COMPLEXITY 2 6/23/2021 Aamnda Hernandez OT GO 1               Amanda Hernandez OT  6/23/2021

## 2021-06-23 NOTE — NURSING NOTE
Pt woke up from sleep saying it was hard to breath laying flat, pt confused wanting to get up out of bed, morphine given. Pt pain better. Patient oxygen 85% sitting edge of bed, pt encouraged to deep breath and placed on 2 L Nasal canula. Patient then became anxious and ativan given. Pt is resting with no complaints at this time.

## 2021-06-23 NOTE — PROGRESS NOTES
"[facility]  [address] [place] [address]. [id number]  T - [phone]  F - [phone]     Progress Note          PATIENT  DEMOGRAPHICS   PATIENT NAME: Dimple Mansfield                      PHYSICIAN: ROSA Ahumada  : 1940  MRN: [id number]   LOS: 2 days    Patient Care Team:  Janice Camara APRN as PCP - General (Family Medicine)  Subjective   SUBJECTIVE   Much more alert today. Wants to go home, very forgetful.         Objective   OBJECTIVE   Vital Signs  Temp:  [96.5 °F (35.8 °C)-98 °F (36.7 °C)] 98 °F (36.7 °C)  Heart Rate:  [] 99  Resp:  [16-20] 16  BP: (137-180)/(75-91) 169/91    Flowsheet Rows        First Filed Value   Admission Height  165.1 cm (65\") Documented at 2021 1006   Admission Weight  58.1 kg (128 lb) Documented at 2021 1006             I/O last 3 completed shifts:  In: 480 [P.O.:480]  Out: -     PHYSICAL EXAM    Physical Exam  Constitutional:       Appearance: She is well-developed.   HENT:      Head: Normocephalic and atraumatic.   Eyes:      Conjunctiva/sclera: Conjunctivae normal.      Pupils: Pupils are equal, round, and reactive to light.   Cardiovascular:      Rate and Rhythm: Normal rate and regular rhythm.   Pulmonary:      Effort: Pulmonary effort is normal.      Breath sounds: Normal breath sounds.   Abdominal:      Palpations: Abdomen is soft.   Musculoskeletal:      Cervical back: Neck supple.      Right lower leg: No edema.      Left lower leg: No edema.   Skin:     General: Skin is warm and dry.   Neurological:      Mental Status: She is alert. She is disoriented.   Psychiatric:         Mood and Affect: Mood normal.         Behavior: Behavior normal.         RESULTS   Results Review:    Results from last 7 days   Lab Units 21  1142 21  0548 21  2222 21  1421 21  0452 21  1404 21  1023   SODIUM mmol/L 119* 118* 119* 118* 118* 120* 119*   POTASSIUM mmol/L  --  4.8  --  4.0 " 3.4*  3.4* 3.3* 3.7   CHLORIDE mmol/L  --  85*  --   --  81* 80* 80*   CO2 mmol/L  --  25.0  --   --  27.0 26.0 27.0   BUN mg/dL  --  6*  --   --  10 11 11   CREATININE mg/dL  --  0.68  --   --  0.85 0.83 0.83   CALCIUM mg/dL  --  8.4*  --   --  8.2* 9.2 9.1   BILIRUBIN mg/dL  --   --   --   --   --   --  0.8   ALK PHOS U/L  --   --   --   --   --   --  65   ALT (SGPT) U/L  --   --   --   --   --   --  11   AST (SGOT) U/L  --   --   --   --   --   --  17   GLUCOSE mg/dL  --  178*  --   --  127* 134* 160*       Estimated Creatinine Clearance: 49.6 mL/min (by C-G formula based on SCr of 0.68 mg/dL).    Results from last 7 days   Lab Units 06/22/21  0452 06/21/21  1023   MAGNESIUM mg/dL 2.9* 0.9*       Results from last 7 days   Lab Units 06/21/21  1023   URIC ACID mg/dL 3.3       Results from last 7 days   Lab Units 06/23/21  0728 06/22/21  0452 06/21/21  1404 06/21/21  1023   WBC 10*3/mm3 7.63 9.48 10.36 9.03   HEMOGLOBIN g/dL 12.0 11.7* 13.6 13.5   PLATELETS 10*3/mm3 352 319 397 362               Imaging Results (Last 24 Hours)       ** No results found for the last 24 hours. **             MEDICATIONS    enoxaparin, 40 mg, Subcutaneous, Q24H  insulin aspart, 0-7 Units, Subcutaneous, TID AC  lisinopril, 20 mg, Oral, Q24H  melatonin, 6 mg, Oral, Nightly  pantoprazole, 40 mg, Oral, Q AM  sodium chloride, 10 mL, Intravenous, Q12H      sodium chloride, 25 mL/hr, Last Rate: 25 mL/hr (06/23/21 0742)        Assessment/Plan   ASSESSMENT / PLAN      Hypomagnesemia    Severe malnutrition (CMS/HCC)    1.  Hyponatremia- sodium down to 119 on arrival and now 120.  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    -Urine Na 27, osmol 605, TSH 1.92, uric acid 3.3    -Changed to 3% saline at 20 ml/hr and Na dropped, now on 25 ml/hr and Na stable at 119, will increase to 30 ml/hr. Recheck Na at 1900.     2.  Hypomagnesemia- replaced     3.  Lung mass- chest x-ray and CT imaging suggestive of  metastatic disease (mediastinal mass with lung mets vs ovarian vs bladder mass), managed per primary team/Oncology     4.  DM2     5.  Hypertension- well-controlled           This document has been electronically signed by ROSA Ahumada on June 23, 2021 14:18 CDT         For this patient encounter, I have reviewed the Nurse Practitioner's documentation, medical decision making, and treatment plan and personally spent time with the patient. Pt is seen on 6/23 . Dw Primary team patient is made comfort care.

## 2021-06-23 NOTE — THERAPY EVALUATION
Patient Name: Dimple Mansfield  : 1940    MRN: [id number]                              Today's Date: 2021       Admit Date: 2021    Visit Dx:     ICD-10-CM ICD-9-CM   1. Hypomagnesemia  E83.42 275.2   2. Confusion  R41.0 298.9   3. Hyponatremia  E87.1 276.1   4. Lung mass  R91.8 786.6   5. Impaired mobility and ADLs  Z74.09 V49.89    Z78.9    6. Impaired functional mobility, balance, gait, and endurance  Z74.09 V49.89     Patient Active Problem List   Diagnosis   • Hypomagnesemia   • Severe malnutrition (CMS/HCC)     Past Medical History:   Diagnosis Date   • Diabetes mellitus (CMS/HCC)    • Hypertension      History reviewed. No pertinent surgical history.  General Information     Row Name 21 1028          Physical Therapy Time and Intention    Document Type  evaluation  -CZ     Mode of Treatment  co-treatment;physical therapy;occupational therapy  -CZ     Row Name 21 1028          General Information    Patient Profile Reviewed  yes  -CZ     Prior Level of Function  independent:;all household mobility  -CZ     Existing Precautions/Restrictions  fall  -CZ     Barriers to Rehab  cognitive status  -CZ     Row Name 21 1028          Living Environment    Lives With  alone  -CZ     Row Name 21 1028          Home Main Entrance    Number of Stairs, Main Entrance  none  -CZ     Row Name 21 1028          Stairs Within Home, Primary    Number of Stairs, Within Home, Primary  none  -CZ     Row Name 21 1028          Cognition    Orientation Status (Cognition)  oriented x 4  -CZ     Row Name 21 1028          Safety Issues, Functional Mobility    Safety Issues Affecting Function (Mobility)  impulsivity;insight into deficits/self-awareness;problem-solving  -CZ     Impairments Affecting Function (Mobility)  balance;endurance/activity tolerance;strength  -CZ     Comment, Safety Issues/Impairments (Mobility)  Answers orientation questions correctly but is clearly confused  and quite impulsive.  -CZ       User Key  (r) = Recorded By, (t) = Taken By, (c) = Cosigned By    Initials Name Provider Type    CZ Uday Fuentes, PT Physical Therapist        Mobility     Row Name 06/23/21 1028          Bed-Chair Transfer    Bed-Chair [place] (Transfers)  contact guard  -CZ     Row Name 06/23/21 1028          Sit-Stand Transfer    Sit-Stand [place] (Transfers)  contact guard  -CZ     Assistive Device (Sit-Stand Transfers)  walker, front-wheeled  -CZ     Row Name 06/23/21 1028          Gait/Stairs (Locomotion)    [place] Level (Gait)  contact guard  -CZ     Assistive Device (Gait)  walker, front-wheeled  -CZ     Distance in Feet (Gait)  100'x1.  -CZ     Comment (Gait/Stairs)  Many cues for proper use of walker.  -CZ       User Key  (r) = Recorded By, (t) = Taken By, (c) = Cosigned By    Initials Name Provider Type    CZ Uday Fuentes, PT Physical Therapist        Obj/Interventions     Row Name 06/23/21 1028          Range of Motion Comprehensive    General Range of Motion  bilateral lower extremity ROM WFL  -CZ     Row Name 06/23/21 1028          Strength Comprehensive (MMT)    General Manual Muscle Testing (MMT) Assessment  other (see comments)  -CZ     Comment, General Manual Muscle Testing (MMT) Assessment  BLEs: 3+/5 grossly.  -CZ     Row Name 06/23/21 1028          Sensory Assessment (Somatosensory)    Sensory Assessment (Somatosensory)  LE sensation intact  -CZ       User Key  (r) = Recorded By, (t) = Taken By, (c) = Cosigned By    Initials Name Provider Type    CZ Uday Fuentes, PT Physical Therapist        Goals/Plan     Row Name 06/23/21 1028          Bed Mobility Goal 1 (PT)    Activity/Assistive Device (Bed Mobility Goal 1, PT)  sit to supine/supine to sit  -CZ     [place] Level/Cues Needed (Bed Mobility Goal 1, PT)  independent  -CZ     Time Frame (Bed Mobility Goal 1, PT)  by discharge  -CZ     Strategies/Barriers (Bed Mobility Goal 1, PT)  HOB flat, no bed  rails.  -CZ     Progress/Outcomes (Bed Mobility Goal 1, PT)  goal not met  -CZ     Row Name 06/23/21 1028          Transfer Goal 1 (PT)    Activity/Assistive Device (Transfer Goal 1, PT)  sit-to-stand/stand-to-sit;bed-to-chair/chair-to-bed;walker, rolling  -CZ     Time Frame (Transfer Goal 1, PT)  by discharge  -CZ     Strategies/Barriers (Transfers Goal 1, PT)  Encourage use of walker.  -CZ     Row Name 06/23/21 1028          Gait Training Goal 1 (PT)    Activity/Assistive Device (Gait Training Goal 1, PT)  walker, rolling  -CZ     [place] Level (Gait Training Goal 1, PT)  modified independence  -CZ     Distance (Gait Training Goal 1, PT)  150'x2.  -CZ     Time Frame (Gait Training Goal 1, PT)  by discharge  -CZ     Strategies/Barriers (Gait Training Goal 1, PT)  Encourage use of walker.  -CZ     Progress/Outcome (Gait Training Goal 1, PT)  goal not met  -CZ     Row Name 06/23/21 1028          ROM Goal 1 (PT)    ROM Goal 1 (PT)  Increase Tinetti score to 24/28.  -CZ     Time Frame (ROM Goal 1, PT)  by discharge  -CZ     Strategies/Barriers (ROM Goal 1, PT)  Encourage use of walker.  -CZ     Progress/Outcome (ROM Goal 1, PT)  goal not met  -CZ       User Key  (r) = Recorded By, (t) = Taken By, (c) = Cosigned By    Initials Name Provider Type    CZ Uady Fuentes, PT Physical Therapist        Clinical Impression     Row Name 06/23/21 1028          Pain    Additional Documentation  Pain Scale: Numbers Pre/Post-Treatment (Group)  -CZ     Row Name 06/23/21 1028          Pain Scale: Numbers Pre/Post-Treatment    Pretreatment Pain Rating  0/10 - no pain  -CZ     Posttreatment Pain Rating  0/10 - no pain  -CZ     Row Name 06/23/21 1028          Plan of Care Review    Plan of Care Reviewed With  patient  -CZ     Outcome Summary  Initial PT evaluation complete; co-evaluation with OT.  Patient is alert, cooperative, answers orientation questions correctly but is clearly confused and quite impulsive.  She requires CGA  with transfers and gait, ambulating 100'x1 with FWW, many cues for proper use of walker.  Tinetti assesed: 19/28 or moderate fall risk; recommend continued use of FWW for ambulation safety.  Patient will need assistance at home if her confusion persists; she would also benefit from HHPT.  Goals established, continue skilled PT.  -CZ     Row Name 06/23/21 1028          Therapy Assessment/Plan (PT)    Criteria for Skilled Interventions Met (PT)  yes;skilled treatment is necessary  -CZ     Row Name 06/23/21 1028          Vital Signs    Pre Systolic BP Rehab  161  -CZ     Pre Treatment Diastolic BP  99  -CZ     Post Systolic BP Rehab  169  -CZ     Post Treatment Diastolic BP  91  -CZ     Pretreatment Heart Rate (beats/min)  79  -CZ     Posttreatment Heart Rate (beats/min)  99  -CZ     Pre SpO2 (%)  96  -CZ     O2 Delivery Pre Treatment  room air  -CZ     Post SpO2 (%)  92  -CZ     Pre Patient Position  Sitting  -CZ     Post Patient Position  Sitting  -CZ     Row Name 06/23/21 1028          Positioning and Restraints    Pre-Treatment Position  sitting in chair/recliner  -CZ     Post Treatment Position  chair  -CZ     In Chair  sitting;call light within reach;encouraged to call for assist;exit alarm on  -CZ       User Key  (r) = Recorded By, (t) = Taken By, (c) = Cosigned By    Initials Name Provider Type    CZ Uday Fuentes, PT Physical Therapist        Outcome Measures     Row Name 06/23/21 1028          How much help from another person do you currently need...    Turning from your back to your side while in flat bed without using bedrails?  3  -CZ     Moving from lying on back to sitting on the side of a flat bed without bedrails?  3  -CZ     Moving to and from a bed to a chair (including a wheelchair)?  3  -CZ     Standing up from a chair using your arms (e.g., wheelchair, bedside chair)?  3  -CZ     Climbing 3-5 steps with a railing?  3  -CZ     To walk in hospital room?  3  -CZ     AM-PAC 6 Clicks Score (PT)  18  " -CZ     Row Name 06/23/21 1028          Tinetti Assessment    Tinetti Assessment  yes  -CZ     Sitting Balance  1  -CZ     Arises  1  -CZ     Attempts to Rise  2  -CZ     Immediate Standing Balance (first 5 sec)  1  -CZ     Standing Balance  1  -CZ     Sternal Nudge (feet close together)  0  -CZ     Eyes Closed (feet close together)  1  -CZ     Turning 360 Degrees- Steps  1  -CZ     Turning 360 Degrees- Steadiness  1  -CZ     Sitting Down  1  -CZ     Tinetti Balance Score  10  -CZ     Gait Initiation (immediate after told \"go\")  1  -CZ     Step Length- Right Swing  1  -CZ     Step Length- Left Swing  1  -CZ     Foot Clearance- Right Foot  1  -CZ     Foot Clearance- Left Foot  1  -CZ     Step Symmetry  1  -CZ     Step Continuity  1  -CZ     Path (excursion)  1  -CZ     Trunk  0  -CZ     Base of Support  1  -CZ     Gait Score  9  -CZ     Tinetti Total Score  19  -CZ     Row Name 06/23/21 1029 06/23/21 1028       Functional Assessment    Outcome Measure Options  AM-PAC 6 Clicks Daily Activity (OT)  -AS  AM-PAC 6 Clicks Basic Mobility (PT);Tinetti  -CZ      User Key  (r) = Recorded By, (t) = Taken By, (c) = Cosigned By    Initials Name Provider Type    CZ Uday Fuentes, PT Physical Therapist    AS Amanda Hernandez, OT Occupational Therapist        Physical Therapy Education                 Title: PT OT SLP Therapies (In Progress)     Topic: Physical Therapy (In Progress)     Point: Mobility training (Not Started)     Learner Progress:  Not documented in this visit.          Point: Home exercise program (Not Started)     Learner Progress:  Not documented in this visit.          Point: Body mechanics (Not Started)     Learner Progress:  Not documented in this visit.          Point: Precautions (In Progress)     Learning Progress Summary           Patient Acceptance, E, NR by CZ at 6/23/2021 [id number]    Comment: Tinetti assessed: 19/28 or moderate fall risk; recommend use of FWW.                               User Key     " Initials Effective Dates Name Provider Type Discipline    CZ 06/16/21 -  Uday Fuentes, PT Physical Therapist PT              PT Recommendation and Plan     Plan of Care Reviewed With: patient  Outcome Summary: Initial PT evaluation complete; co-evaluation with OT.  Patient is alert, cooperative, answers orientation questions correctly but is clearly confused and quite impulsive.  She requires CGA with transfers and gait, ambulating 100'x1 with FWW, many cues for proper use of walker.  Tinetti assesed: 19/28 or moderate fall risk; recommend continued use of FWW for ambulation safety.  Patient will need assistance at home if her confusion persists; she would also benefit from HHPT.  Goals established, continue skilled PT.     Time Calculation:   PT Charges     Row Name 06/23/21 1409             Time Calculation    Start Time  1028  -CZ      Stop Time  1058  -CZ      Time Calculation (min)  30 min  -CZ      PT Received On  06/23/21  -CZ      PT Goal Re-Cert Due Date  07/06/21  -CZ         Untimed Charges    PT Eval/Re-eval Minutes  30  -CZ         Total Minutes    Untimed Charges Total Minutes  30  -CZ       Total Minutes  30  -CZ        User Key  (r) = Recorded By, (t) = Taken By, (c) = Cosigned By    Initials Name Provider Type    CZ Uday Fuentes, PT Physical Therapist        Therapy Charges for Today     Code Description Service Date Service Provider Modifiers Qty    [id number] HC PT EVAL MOD COMPLEXITY 2 6/23/2021 Uday Fuentes, PT GP 1          PT G-Codes  Outcome Measure Options: AM-PAC 6 Clicks Daily Activity (OT)  AM-PAC 6 Clicks Score (PT): 18  AM-PAC 6 Clicks Score (OT): 19  Tinetti Total Score: 19    Uday Fuentes PT  6/23/2021

## 2021-06-23 NOTE — NURSING NOTE
Patient anxious this morning upon assessment, pt progressively got more confused, anxious and agitated throughout the day. Patient was calling son asking him to come get her because we were holding her hostage. She told staffing that she was not at the hospital that she only came her to get a lottery ticket and she wanted to go home. Patient is used to being independent at home. Pt a fall risk, Patient constantly getting out of bed and chair. Pt encouraged to watch TV, color, or listen to music. Patient walking out of room and does not comply with instructions. This RN sat in room with patient from [id number]. Son was notified that patient was anxious, pt stated that he would come see patient as soon as he could leave work. Family came to bedside around 143. Ativan given at 1455, pt is now resting comfortably.Patient converted to comfort care.

## 2021-06-23 NOTE — PLAN OF CARE
Goal Outcome Evaluation:  Plan of Care Reviewed With: patient           Outcome Summary: OT eval completed; co-eval with PT. Pt agreeable and cooperative. Pt performed transfers, ambulation, and toileting tasks with CGA. Due to unsteadiness without use of an AD, pt instructed in use of RW. Pt required max vc to safely manage walker during ambulation and functional task. Pt is very impulsive with poor safety awareness. Pt demo decreased short term memory and required frequent repetition of fall precautions and using call bell before getting up. Pt would benefit from continued skilled OT services to increase safety and independence in ADLs. Rec home with assist (due to safety concerns) and  OT.

## 2021-06-23 NOTE — PROGRESS NOTES
[facility]  INPATIENT PROGRESS NOTE    Length of Stay: 2  Date of Admission: 6/21/2021  Primary Care Physician: Janice Camara APRN    Subjective   Chief Complaint: No complaints    HPI:      6/23/2021: The patient had increased confusion today.  I spoke with family and they have opted for comfort measures.  They would like the patient to be discharged on comfort measures preferably to [place].  Case management notified.    6/22/2021:  Patient has no complaints today.      H&P by Dr. Carlos: Patient is a 81 y.o. female with history of hypertension, type 2 diabetes mellitus who was brought to the emergency department by his son secondary to progressively worsening functional decline associated with decreased level of consciousness and decreased oral intake.    There is no reported history of fever, chills, nausea, vomiting, chest pain, shortness of air, hematemesis, melena, hematochezia, hemoptysis, dysuria, hematuria, headaches or blurry vision.     On triage, patient was hemodynamically stable.  Noncontrast CT scan of the head did not show any acute changes and blood work showed sodium 119, potassium 3.3 and magnesium of 0.9.  Chest x-ray showed bilateral pulmonary nodules compatible with pulmonary metastasis.    Review of Systems   Constitutional: Negative for activity change and fatigue.   HENT: Negative for ear pain and sore throat.    Eyes: Negative for pain and discharge.   Respiratory: Negative for cough and shortness of breath.    Cardiovascular: Negative for chest pain and palpitations.   Gastrointestinal: Negative for abdominal pain and nausea.   Endocrine: Negative for cold intolerance and heat intolerance.   Genitourinary: Negative for difficulty urinating and dysuria.   Musculoskeletal: Negative for arthralgias and gait problem.   Skin: Negative for color change and rash.   Neurological: Negative for dizziness and weakness.      Psychiatric/Behavioral: Negative for agitation and confusion.        Objective    Temp:  [96.5 °F (35.8 °C)-98 °F (36.7 °C)] 98 °F (36.7 °C)  Heart Rate:  [] 99  Resp:  [16-20] 16  BP: (137-180)/(75-91) 169/91    Physical Exam  Constitutional:       Appearance: She is well-developed.   HENT:      Head: Normocephalic and atraumatic.   Eyes:      Pupils: Pupils are equal, round, and reactive to light.   Cardiovascular:      Rate and Rhythm: Normal rate and regular rhythm.   Pulmonary:      Effort: Pulmonary effort is normal.      Breath sounds: Normal breath sounds.   Abdominal:      General: Bowel sounds are normal.      Palpations: Abdomen is soft.   Musculoskeletal:         General: Normal range of motion.      Cervical back: Normal range of motion and neck supple.   Skin:     General: Skin is warm and dry.   Neurological:      Mental Status: She is alert and oriented to person, place, and time.   Psychiatric:         Behavior: Behavior normal.       Results Review:  I have reviewed the labs, radiology results, and diagnostic studies.    Laboratory Data:   Results from last 7 days   Lab Units 06/23/21  1142 06/23/21  0548 06/22/21  2222 06/22/21  1421 06/22/21  0452 06/21/21  1404 06/21/21  1023   SODIUM mmol/L 119* 118* 119* 118* 118* 120* 119*   POTASSIUM mmol/L  --  4.8  --  4.0 3.4*  3.4* 3.3* 3.7   CHLORIDE mmol/L  --  85*  --   --  81* 80* 80*   CO2 mmol/L  --  25.0  --   --  27.0 26.0 27.0   BUN mg/dL  --  6*  --   --  10 11 11   CREATININE mg/dL  --  0.68  --   --  0.85 0.83 0.83   GLUCOSE mg/dL  --  178*  --   --  127* 134* 160*   CALCIUM mg/dL  --  8.4*  --   --  8.2* 9.2 9.1   BILIRUBIN mg/dL  --   --   --   --   --   --  0.8   ALK PHOS U/L  --   --   --   --   --   --  65   ALT (SGPT) U/L  --   --   --   --   --   --  11   AST (SGOT) U/L  --   --   --   --   --   --  17   ANION GAP mmol/L  --  8.0  --   --  10.0 14.0 12.0     Estimated Creatinine Clearance: 49.6 mL/min (by C-G formula based on  SCr of 0.68 mg/dL).  Results from last 7 days   Lab Units 06/22/21  0452 06/21/21  1023   MAGNESIUM mg/dL 2.9* 0.9*     Results from last 7 days   Lab Units 06/21/21  1023   URIC ACID mg/dL 3.3     Results from last 7 days   Lab Units 06/23/21  0728 06/22/21  0452 06/21/21  1404 06/21/21  1023   WBC 10*3/mm3 7.63 9.48 10.36 9.03   HEMOGLOBIN g/dL 12.0 11.7* 13.6 13.5   HEMATOCRIT % 34.0 32.1* 37.8 37.0   PLATELETS 10*3/mm3 352 319 397 362           Culture Data:   No results found for: BLOODCX  No results found for: URINECX  No results found for: RESPCX  No results found for: WOUNDCX  No results found for: STOOLCX  No components found for: BODYFLD    Radiology Data:   Imaging Results (Last 24 Hours)     ** No results found for the last 24 hours. **          I have reviewed the patient's current medications.     Assessment/Plan     Active Hospital Problems    Diagnosis    • Severe malnutrition (CMS/HCC)    • Hypomagnesemia        Plan:      Metastatic lung nodules of undetermined primary: Family has opted comfort measures  Labs, medications and vital signs have been discontinued.  Medications for pain and comfort ordered.    Discharge Planning: I expect patient to be discharged to home in 1-2 days.    I confirmed that the patient's Advance Care Plan is present, code status is documented, or surrogate decision maker is listed in the patient's medical record.      I have utilized all available immediate resources to obtain, update, or review the patient's current medications.         This document has been electronically signed by ROSA Reid on June 23, 2021 15:02 CDT

## 2021-06-23 NOTE — DISCHARGE PLACEMENT REQUEST
"Dimple Mansfield (81 y.o. Female)     Date of Birth Social Security Number Address Home Phone MRN    1940  [address] [facility] [address] [phone] [id number]    Jainism Marital Status          Non-Caodaism        Admission Date Admission Type Admitting Provider Attending Provider Department, Room/Bed    6/21/21 Emergency Cash Carlos MD Echendu, Anthony W, MD [address], 414/1    Discharge Date Discharge Disposition Discharge Destination                       Attending Provider: Cash Carlos MD    Allergies: No Known Allergies    Isolation: None   Infection: None   Code Status: No CPR    Ht: 165.1 cm (65\")   Wt: 58 kg (127 lb 12.8 oz)    Admission Cmt: None   Principal Problem: None                Active Insurance as of 6/21/2021     Primary Coverage     Payor Plan Insurance Group Employer/Plan Group    MEDICARE MEDICARE A & B      Payor Plan Address Payor Plan Phone Number Payor Plan Fax Number Effective Dates    [address] [phone]  6/1/2004 - None Entered    [facility] [id number]       Subscriber Name Subscriber Birth Date Member ID       DIMPLE MANSFIELD 1940 [id number]           Secondary Coverage     Payor Plan Insurance Group Employer/Plan Group    [place]      Payor Plan Address Payor Plan Phone Number Payor Plan Fax Number Effective Dates    [address]   6/1/2021 - None Entered    [facility] [id number]       Subscriber Name Subscriber Birth Date Member ID       DIMPLE MANSFIELD 1940 [id number]                 Emergency Contacts      (Rel.) Home Phone Work Phone Mobile Phone    Brooks Mansfield (Son) [phone] -- [phone]            Insurance Information                MEDICARE/MEDICARE A & B Phone: [phone]    Subscriber: Dimple Mansfield Subscriber#: [id number]    Group#:  Precert#:         [place] Phone:     Subscriber: " ShylaDimple Subscriber#: [id number]    Group#:  Precert#:              History & Physical      Cash Carlos MD at 06/21/21 1320                [facility] Medicine Services  INPATIENT HISTORY AND PHYSICAL       Patient Care Team:  Janice Camara APRN as PCP - General (Family Medicine)    Chief complaint   Chief Complaint   Patient presents with   • Altered Mental Status       Subjective     Patient is a 81 y.o. female with history of hypertension, type 2 diabetes mellitus who was brought to the emergency department by his son secondary to progressively worsening functional decline associated with decreased level of consciousness and decreased oral intake.    There is no reported history of fever, chills, nausea, vomiting, chest pain, shortness of air, hematemesis, melena, hematochezia, hemoptysis, dysuria, hematuria, headaches or blurry vision.    On triage, patient was hemodynamically stable.  Noncontrast CT scan of the head did not show any acute changes and blood work showed sodium 119, potassium 3.3 and magnesium of 0.9.  Chest x-ray showed bilateral pulmonary nodules compatible with pulmonary metastasis.    Review of Systems   Constitutional: Positive for activity change, appetite change and fatigue. Negative for chills, diaphoresis and fever.   HENT: Negative for trouble swallowing and voice change.    Eyes: Negative for photophobia and visual disturbance.   Respiratory: Negative for cough, choking, chest tightness, shortness of breath, wheezing and stridor.    Cardiovascular: Negative for chest pain, palpitations and leg swelling.   Gastrointestinal: Negative for abdominal distention, abdominal pain, blood in stool, constipation, diarrhea, nausea and vomiting.   Endocrine: Negative for cold intolerance, heat intolerance, polydipsia, polyphagia and polyuria.   Genitourinary: Negative for decreased urine volume, difficulty urinating, dysuria,  enuresis, flank pain, frequency, hematuria and urgency.   Musculoskeletal: Negative for arthralgias, gait problem, myalgias, neck pain and neck stiffness.   Skin: Negative for pallor, rash and wound.   Neurological: Negative for dizziness, tremors, seizures, syncope, facial asymmetry, speech difficulty, weakness, light-headedness, numbness and headaches.   Hematological: Does not bruise/bleed easily.   Psychiatric/Behavioral: Negative for agitation, behavioral problems and confusion.         History  Past Medical History:   Diagnosis Date   • Diabetes mellitus (CMS/[facility])    • Hypertension      History reviewed. No pertinent surgical history.  History reviewed. No pertinent family history.  Social History     Tobacco Use   • Smoking status: Not on file   Substance Use Topics   • Alcohol use: Not on file   • Drug use: Not on file     (Not in a hospital admission)    Allergies:  Patient has no known allergies.  Prior to Admission medications    Medication Sig Start Date End Date Taking? Authorizing Provider   lisinopril (PRINIVIL,ZESTRIL) 40 MG tablet Take 40 mg by mouth Daily.   Yes ProviderAbilio MD   metFORMIN (GLUCOPHAGE) 500 MG tablet Take 500 mg by mouth 2 (Two) Times a Day With Meals.   Yes ProviderAbilio MD       Objective        Vital Signs  Temp:  [97.6 °F (36.4 °C)] 97.6 °F (36.4 °C)  Heart Rate:  [] 100  Resp:  [20] 20  BP: (126-159)/(69-76) 159/76      Physical Exam  Vitals and nursing note reviewed.   Constitutional:       General: She is not in acute distress.     Appearance: She is cachectic. She is not diaphoretic.      Comments: Patient is seen with cachectic/malnourished.     HENT:      Head: Normocephalic and atraumatic.      Right Ear: External ear normal.      Left Ear: External ear normal.      Nose: Nose normal.   Eyes:      Extraocular Movements: Extraocular movements intact.      Conjunctiva/sclera: Conjunctivae normal.      Pupils: Pupils are equal, round, and reactive to  light.   Neck:      Thyroid: No thyromegaly.      Vascular: No JVD.      Comments: She has a clean and dry surgical wound on the left neck from a recent biopsy.  Cardiovascular:      Rate and Rhythm: Normal rate and regular rhythm.      Heart sounds: Normal heart sounds. No murmur heard.   No friction rub. No gallop.    Pulmonary:      Effort: Pulmonary effort is normal. No respiratory distress.      Breath sounds: Normal breath sounds. No stridor. No wheezing or rales.   Chest:      Chest wall: No tenderness.   Abdominal:      General: Bowel sounds are normal. There is no distension.      Palpations: Abdomen is soft. There is no mass.      Tenderness: There is no abdominal tenderness. There is no right CVA tenderness, left CVA tenderness, guarding or rebound.      Hernia: No hernia is present.   Musculoskeletal:         General: No tenderness or deformity. Normal range of motion.      Cervical back: Normal range of motion and neck supple.   Skin:     General: Skin is warm and dry.      Coloration: Skin is not jaundiced or pale.      Findings: No bruising, erythema, lesion or rash.   Neurological:      General: No focal deficit present.      Mental Status: She is alert and oriented to person, place, and time.      Cranial Nerves: No cranial nerve deficit.      Sensory: No sensory deficit.      Motor: No weakness.      Coordination: Coordination normal.      Gait: Gait normal.      Deep Tendon Reflexes: Reflexes are normal and symmetric. Reflexes normal.   Psychiatric:         Mood and Affect: Mood normal.         Behavior: Behavior normal. Behavior is cooperative.         Thought Content: Thought content normal.         Judgment: Judgment normal.           Results Review:     Results from last 7 days   Lab Units 06/21/21  1023   SODIUM mmol/L 119*   POTASSIUM mmol/L 3.7   CHLORIDE mmol/L 80*   CO2 mmol/L 27.0   BUN mg/dL 11   CREATININE mg/dL 0.83   GLUCOSE mg/dL 160*   CALCIUM mg/dL 9.1   BILIRUBIN mg/dL 0.8   ALK  PHOS U/L 65   ALT (SGPT) U/L 11   AST (SGOT) U/L 17       Results from last 7 days   Lab Units 06/21/21  1023   MAGNESIUM mg/dL 0.9*       Results from last 7 days   Lab Units 06/21/21  1023   WBC 10*3/mm3 9.03   HEMOGLOBIN g/dL 13.5   HEMATOCRIT % 37.0   PLATELETS 10*3/mm3 362           Imaging Results (Last 7 Days)     Procedure Component Value Units Date/Time    CT Head Without Contrast [[id number]] Collected: 06/21/21 1046     Updated: 06/21/21 1103    Narrative:        PROCEDURE: CT head without contrast    REASON FOR EXAM: confusion    This exam was performed according to our departmental  dose-optimization program, which includes automated exposure  control, adjustment of the mA and/or kV according to patient size  and/or use of iterative reconstruction technique.    FINDINGS: Axial computer tomography sequential imaging of the  head was performed from the vertex to the base of the skull.  .Sagittal and coronal reformation was performed .    The skull vault is intact. Paranasal sinuses and bilateral  mastoid air cells are well aerated. Mild to moderate cerebral  involutional changes. Bilateral frontal and parietal lobe  periventricular deep white matter small foci of hypodensity.  Cerebral and cerebellar parenchymal are otherwise normal.  Ventricular system and subarachnoid spaces are normal.      Impression:      1.  No acute intracranial abnormality.  2.  Mild to moderate cerebral involutional changes.  3.  Bilateral frontal and parietal lobe periventricular deep  white matter small foci of chronic ischemic gliosis secondary to  microvascular disease.    Electronically signed by:  Allen Franco MD  6/21/2021 11:02 AM CDT  Workstation: [id number]    XR Chest 2 View [[id number]] Collected: 06/21/21 1027     Updated: 06/21/21 1055    Narrative:      EXAM DESCRIPTION:     XR CHEST 2 VW    CLINICAL HISTORY:     81 years  Female  confusion    COMPARISON:     None available    TECHNIQUE:     Two views-PA and lateral  radiographs of the chest    FINDINGS:     There are bilateral pulmonary nodules noted the largest on the  right measures 3.2 cm in size. There is nodularity in the left  infrahilar region with a cluster of nodules noted the largest  measuring approximately 4.4 cm in size. There is probable left  hilar adenopathy mediastinal adenopathy as well. Further  evaluation with CT is strongly recommended.    The bony structures are intact. The cardiac silhouette and  pulmonary vasculature are within normal limits.      Impression:          1. Findings as above compatible with pulmonary metastasis with  probable left hilar and mediastinal adenopathy. Further  evaluation with a contrast-enhanced CT is strongly recommended  for more definitive assessment.        Electronically signed by:  Brooke Rodriguez MD  6/21/2021 10:54 AM  CDT Workstation: [id number]          Assessment / Plan       Hospital Problem List:    Hypomagnesemia    Hypomagnesemia: Begin magnesium repletion protocol.    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  Nephrologist has been consulted.    Hypokalemia: Begin potassium repletion protocol.    Metastatic lung nodules of undetermined primary: Patient will be scheduled for CT scan of the chest followed by biopsy/staging CT of the abdomen and pelvis.  We will consult oncology.    Malnutrition: Consult dietitian.    Diabetes mellitus: Begin Accu-Cheks and sliding scale insulin.  Hold Metformin for now.    Hypertension: Continue lisinopril.    Begin GI and DVT prophylaxis.    Additional orders and treatment plan as hospital dictates.    CODE STATUS was discussed with the patient and she wishes to be full code.      I confirmed that the patient's Advance Care Plan is present, code status is documented, or surrogate decision maker is listed in the patient's medical record.     I have utilized all available immediate resources to  obtain, update, or review the patient's current medications    I discussed the patient's findings and my recommendations with patient and his son.     Cash Carlos MD  06/21/21  13:20 CDT      Dictated Utilizing Dragon Dictation         Electronically signed by Cash Carlos MD at 06/21/21 1832         Current Facility-Administered Medications   Medication Dose Route Frequency Provider Last Rate Last Admin   • acetaminophen (TYLENOL) tablet 650 mg  650 mg Oral Q4H PRN Cash Carlos MD        Or   • acetaminophen (TYLENOL) 160 MG/5ML solution 650 mg  650 mg Oral Q4H PRN Cash Carlos MD        Or   • acetaminophen (TYLENOL) suppository 650 mg  650 mg Rectal Q4H PRN Cash Carlos MD       • LORazepam (ATIVAN) injection 1 mg  1 mg Intravenous Q2H PRN Ann Jaeger APRN       • morphine injection 2 mg  2 mg Intravenous Q2H PRN Ann Jaeger APRANAND       • ondansetron (ZOFRAN) injection 4 mg  4 mg Intravenous Q6H PRN Cash Carlos MD            Physician Progress Notes (last 24 hours) (Notes from 06/22/21 1528 through 06/23/21 1528)      Ann Jaeger APRN at 06/23/21 1502              [facility] Medicine Services  INPATIENT PROGRESS NOTE    Length of Stay: 2  Date of Admission: 6/21/2021  Primary Care Physician: Janice Camara APRN    Subjective   Chief Complaint: No complaints    HPI:      6/23/2021: The patient had increased confusion today.  I spoke with family and they have opted for comfort measures.  They would like the patient to be discharged on comfort measures preferably to [facility].  Case management notified.    6/22/2021:  Patient has no complaints today.      H&P by Dr. Carlos: Patient is a 81 y.o. female with history of hypertension, type 2 diabetes mellitus who was brought to the emergency department by his son secondary to progressively worsening functional decline associated with decreased level of  consciousness and decreased oral intake.    There is no reported history of fever, chills, nausea, vomiting, chest pain, shortness of air, hematemesis, melena, hematochezia, hemoptysis, dysuria, hematuria, headaches or blurry vision.     On triage, patient was hemodynamically stable.  Noncontrast CT scan of the head did not show any acute changes and blood work showed sodium 119, potassium 3.3 and magnesium of 0.9.  Chest x-ray showed bilateral pulmonary nodules compatible with pulmonary metastasis.    Review of Systems   Constitutional: Negative for activity change and fatigue.   HENT: Negative for ear pain and sore throat.    Eyes: Negative for pain and discharge.   Respiratory: Negative for cough and shortness of breath.    Cardiovascular: Negative for chest pain and palpitations.   Gastrointestinal: Negative for abdominal pain and nausea.   Endocrine: Negative for cold intolerance and heat intolerance.   Genitourinary: Negative for difficulty urinating and dysuria.   Musculoskeletal: Negative for arthralgias and gait problem.   Skin: Negative for color change and rash.   Neurological: Negative for dizziness and weakness.   Psychiatric/Behavioral: Negative for agitation and confusion.        Objective    Temp:  [96.5 °F (35.8 °C)-98 °F (36.7 °C)] 98 °F (36.7 °C)  Heart Rate:  [] 99  Resp:  [16-20] 16  BP: (137-180)/(75-91) 169/91    Physical Exam  Constitutional:       Appearance: She is well-developed.   HENT:      Head: Normocephalic and atraumatic.   Eyes:      Pupils: Pupils are equal, round, and reactive to light.   Cardiovascular:      Rate and Rhythm: Normal rate and regular rhythm.   Pulmonary:      Effort: Pulmonary effort is normal.      Breath sounds: Normal breath sounds.   Abdominal:      General: Bowel sounds are normal.      Palpations: Abdomen is soft.   Musculoskeletal:         General: Normal range of motion.      Cervical back: Normal range of motion and neck supple.   Skin:     General:  Skin is warm and dry.   Neurological:      Mental Status: She is alert and oriented to person, place, and time.   Psychiatric:         Behavior: Behavior normal.       Results Review:  I have reviewed the labs, radiology results, and diagnostic studies.    Laboratory Data:   Results from last 7 days   Lab Units 06/23/21  1142 06/23/21  0548 06/22/21  2222 06/22/21  1421 06/22/21  0452 06/21/21  1404 06/21/21  1023   SODIUM mmol/L 119* 118* 119* 118* 118* 120* 119*   POTASSIUM mmol/L  --  4.8  --  4.0 3.4*  3.4* 3.3* 3.7   CHLORIDE mmol/L  --  85*  --   --  81* 80* 80*   CO2 mmol/L  --  25.0  --   --  27.0 26.0 27.0   BUN mg/dL  --  6*  --   --  10 11 11   CREATININE mg/dL  --  0.68  --   --  0.85 0.83 0.83   GLUCOSE mg/dL  --  178*  --   --  127* 134* 160*   CALCIUM mg/dL  --  8.4*  --   --  8.2* 9.2 9.1   BILIRUBIN mg/dL  --   --   --   --   --   --  0.8   ALK PHOS U/L  --   --   --   --   --   --  65   ALT (SGPT) U/L  --   --   --   --   --   --  11   AST (SGOT) U/L  --   --   --   --   --   --  17   ANION GAP mmol/L  --  8.0  --   --  10.0 14.0 12.0     Estimated Creatinine Clearance: 49.6 mL/min (by C-G formula based on SCr of 0.68 mg/dL).  Results from last 7 days   Lab Units 06/22/21  0452 06/21/21  1023   MAGNESIUM mg/dL 2.9* 0.9*     Results from last 7 days   Lab Units 06/21/21  1023   URIC ACID mg/dL 3.3     Results from last 7 days   Lab Units 06/23/21  0728 06/22/21  0452 06/21/21  1404 06/21/21  1023   WBC 10*3/mm3 7.63 9.48 10.36 9.03   HEMOGLOBIN g/dL 12.0 11.7* 13.6 13.5   HEMATOCRIT % 34.0 32.1* 37.8 37.0   PLATELETS 10*3/mm3 352 319 397 362           Culture Data:   No results found for: BLOODCX  No results found for: URINECX  No results found for: RESPCX  No results found for: WOUNDCX  No results found for: STOOLCX  No components found for: BODYFLD    Radiology Data:   Imaging Results (Last 24 Hours)     ** No results found for the last 24 hours. **          I have reviewed the patient's current  "medications.     Assessment/Plan     Active Hospital Problems    Diagnosis    • Severe malnutrition (CMS/HCC)    • Hypomagnesemia        Plan:      Metastatic lung nodules of undetermined primary: Family has opted comfort measures  Labs, medications and vital signs have been discontinued.  Medications for pain and comfort ordered.    Discharge Planning: I expect patient to be discharged to home in 1-2 days.    I confirmed that the patient's Advance Care Plan is present, code status is documented, or surrogate decision maker is listed in the patient's medical record.      I have utilized all available immediate resources to obtain, update, or review the patient's current medications.         This document has been electronically signed by ROSA Reid on 2021 15:02 CDT          Electronically signed by Ann Jaeger APRN at 21 1507     Ori Field APRN at 21 1418          [facility]  [address] [place] [address]. [address]  T - [phone]  F - [phone]     Progress Note          PATIENT  DEMOGRAPHICS   PATIENT NAME: Dimple Mansfield                      PHYSICIAN: ROSA Ahumada  : 1940  MRN: [id number]   LOS: 2 days    Patient Care Team:  Janice Camara APRN as PCP - General (Family Medicine)  Subjective   SUBJECTIVE   Much more alert today. Wants to go home, very forgetful.         Objective   OBJECTIVE   Vital Signs  Temp:  [96.5 °F (35.8 °C)-98 °F (36.7 °C)] 98 °F (36.7 °C)  Heart Rate:  [] 99  Resp:  [16-20] 16  BP: (137-180)/(75-91) 169/91    Flowsheet Rows      First Filed Value   Admission Height  165.1 cm (65\") Documented at 2021 1006   Admission Weight  58.1 kg (128 lb) Documented at 2021 1006           I/O last 3 completed shifts:  In: 480 [P.O.:480]  Out: -     PHYSICAL EXAM    Physical Exam  Constitutional:       Appearance: She is well-developed.   HENT:      Head: Normocephalic and " atraumatic.   Eyes:      Conjunctiva/sclera: Conjunctivae normal.      Pupils: Pupils are equal, round, and reactive to light.   Cardiovascular:      Rate and Rhythm: Normal rate and regular rhythm.   Pulmonary:      Effort: Pulmonary effort is normal.      Breath sounds: Normal breath sounds.   Abdominal:      Palpations: Abdomen is soft.   Musculoskeletal:      Cervical back: Neck supple.      Right lower leg: No edema.      Left lower leg: No edema.   Skin:     General: Skin is warm and dry.   Neurological:      Mental Status: She is alert. She is disoriented.   Psychiatric:         Mood and Affect: Mood normal.         Behavior: Behavior normal.         RESULTS   Results Review:    Results from last 7 days   Lab Units 06/23/21  1142 06/23/21  0548 06/22/21  2222 06/22/21  1421 06/22/21  0452 06/21/21  1404 06/21/21  1023   SODIUM mmol/L 119* 118* 119* 118* 118* 120* 119*   POTASSIUM mmol/L  --  4.8  --  4.0 3.4*  3.4* 3.3* 3.7   CHLORIDE mmol/L  --  85*  --   --  81* 80* 80*   CO2 mmol/L  --  25.0  --   --  27.0 26.0 27.0   BUN mg/dL  --  6*  --   --  10 11 11   CREATININE mg/dL  --  0.68  --   --  0.85 0.83 0.83   CALCIUM mg/dL  --  8.4*  --   --  8.2* 9.2 9.1   BILIRUBIN mg/dL  --   --   --   --   --   --  0.8   ALK PHOS U/L  --   --   --   --   --   --  65   ALT (SGPT) U/L  --   --   --   --   --   --  11   AST (SGOT) U/L  --   --   --   --   --   --  17   GLUCOSE mg/dL  --  178*  --   --  127* 134* 160*       Estimated Creatinine Clearance: 49.6 mL/min (by C-G formula based on SCr of 0.68 mg/dL).    Results from last 7 days   Lab Units 06/22/21  0452 06/21/21  1023   MAGNESIUM mg/dL 2.9* 0.9*       Results from last 7 days   Lab Units 06/21/21  1023   URIC ACID mg/dL 3.3       Results from last 7 days   Lab Units 06/23/21  0728 06/22/21  0452 06/21/21  1404 06/21/21  1023   WBC 10*3/mm3 7.63 9.48 10.36 9.03   HEMOGLOBIN g/dL 12.0 11.7* 13.6 13.5   PLATELETS 10*3/mm3 352 319 397 362               Imaging  Results (Last 24 Hours)     ** No results found for the last 24 hours. **           MEDICATIONS    enoxaparin, 40 mg, Subcutaneous, Q24H  insulin aspart, 0-7 Units, Subcutaneous, TID AC  lisinopril, 20 mg, Oral, Q24H  melatonin, 6 mg, Oral, Nightly  pantoprazole, 40 mg, Oral, Q AM  sodium chloride, 10 mL, Intravenous, Q12H      sodium chloride, 25 mL/hr, Last Rate: 25 mL/hr (06/23/21 0742)        Assessment/Plan   ASSESSMENT / PLAN      Hypomagnesemia    Severe malnutrition (CMS/HCC)    1.  Hyponatremia- sodium down to 119 on arrival and now 120.  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    -Urine Na 27, osmol 605, TSH 1.92, uric acid 3.3    -Changed to 3% saline at 20 ml/hr and Na dropped, now on 25 ml/hr and Na stable at 119, will increase to 30 ml/hr. Recheck Na at 1900.     2.  Hypomagnesemia- replaced     3.  Lung mass- chest x-ray and CT imaging suggestive of metastatic disease (mediastinal mass with lung mets vs ovarian vs bladder mass), managed per primary team/Oncology     4.  DM2     5.  Hypertension- well-controlled           This document has been electronically signed by ROSA Ahumada on June 23, 2021 14:18 CDT             Electronically signed by Ori Field APRN at 06/23/21 1456       Consult Notes (last 24 hours) (Notes from 06/22/21 1528 through 06/23/21 1528)    No notes of this type exist for this encounter.            Physical Therapy Notes (last 24 hours) (Notes from 06/22/21 1528 through 06/23/21 1528)      Uday Fuentes, PT at 06/23/21 1409  Version 1 of 1       Goal Outcome Evaluation:  Plan of Care Reviewed With: patient           Outcome Summary: Initial PT evaluation complete; co-evaluation with OT.  Patient is alert, cooperative, answers orientation questions correctly but is clearly confused and quite impulsive.  She requires CGA with transfers and gait, ambulating 100'x1 with FWW, many cues for proper use of walker.  Tinetti assesed:   or moderate fall risk; recommend continued use of FWW for ambulation safety.  Patient will need assistance at home if her confusion persists; she would also benefit from HHPT.  Goals established, continue skilled PT.    Electronically signed by Uday Fuentes, PT at 21 1402     Uday Fuentes, PT at 21 1412  Version 1 of 1         Patient Name: Dimple Mansfield  : 1940    MRN: [id number]                              Today's Date: 2021       Admit Date: 2021    Visit Dx:     ICD-10-CM ICD-9-CM   1. Hypomagnesemia  E83.42 275.2   2. Confusion  R41.0 298.9   3. Hyponatremia  E87.1 276.1   4. Lung mass  R91.8 786.6   5. Impaired mobility and ADLs  Z74.09 V49.89    Z78.9    6. Impaired functional mobility, balance, gait, and endurance  Z74.09 V49.89     Patient Active Problem List   Diagnosis   • Hypomagnesemia   • Severe malnutrition (CMS/HCC)     Past Medical History:   Diagnosis Date   • Diabetes mellitus (CMS/HCC)    • Hypertension      History reviewed. No pertinent surgical history.  General Information     Row Name 21 1028          Physical Therapy Time and Intention    Document Type  evaluation  -CZ     Mode of Treatment  co-treatment;physical therapy;occupational therapy  -CZ     Row Name 21 1028          General Information    Patient Profile Reviewed  yes  -CZ     Prior Level of Function  independent:;all household mobility  -CZ     Existing Precautions/Restrictions  fall  -CZ     Barriers to Rehab  cognitive status  -CZ     Row Name 21 1028          Living Environment    Lives With  alone  -CZ     Row Name 21 1028          Home Main Entrance    Number of Stairs, Main Entrance  none  -CZ     Row Name 21 1028          Stairs Within Home, Primary    Number of Stairs, Within Home, Primary  none  -CZ     Row Name 21 1028          Cognition    Orientation Status (Cognition)  oriented x 4  -CZ     Row Name 21 1028          Safety  Issues, Functional Mobility    Safety Issues Affecting Function (Mobility)  impulsivity;insight into deficits/self-awareness;problem-solving  -CZ     Impairments Affecting Function (Mobility)  balance;endurance/activity tolerance;strength  -CZ     Comment, Safety Issues/Impairments (Mobility)  Answers orientation questions correctly but is clearly confused and quite impulsive.  -CZ       User Key  (r) = Recorded By, (t) = Taken By, (c) = Cosigned By    Initials Name Provider Type    CZ Uday Fuentes, PT Physical Therapist        Mobility     Row Name 06/23/21 1028          Bed-Chair Transfer    Bed-Chair [place] (Transfers)  contact guard  -CZ     Row Name 06/23/21 1028          Sit-Stand Transfer    Sit-Stand [place] (Transfers)  contact guard  -CZ     Assistive Device (Sit-Stand Transfers)  walker, front-wheeled  -CZ     Row Name 06/23/21 1028          Gait/Stairs (Locomotion)    [place] Level (Gait)  contact guard  -CZ     Assistive Device (Gait)  walker, front-wheeled  -CZ     Distance in Feet (Gait)  100'x1.  -CZ     Comment (Gait/Stairs)  Many cues for proper use of walker.  -CZ       User Key  (r) = Recorded By, (t) = Taken By, (c) = Cosigned By    Initials Name Provider Type    CZ Uday Fuentes, PT Physical Therapist        Obj/Interventions     Row Name 06/23/21 1028          Range of Motion Comprehensive    General Range of Motion  bilateral lower extremity ROM WFL  -     Row Name 06/23/21 1028          Strength Comprehensive (MMT)    General Manual Muscle Testing (MMT) Assessment  other (see comments)  -CZ     Comment, General Manual Muscle Testing (MMT) Assessment  BLEs: 3+/5 grossly.  -CZ     Row Name 06/23/21 1028          Sensory Assessment (Somatosensory)    Sensory Assessment (Somatosensory)  LE sensation intact  -CZ       User Key  (r) = Recorded By, (t) = Taken By, (c) = Cosigned By    Initials Name Provider Type    Uday Noland, PT Physical Therapist             Goals/Plan     Row Name 06/23/21 1028          Bed Mobility Goal 1 (PT)    Activity/Assistive Device (Bed Mobility Goal 1, PT)  sit to supine/supine to sit  -CZ     [place] Level/Cues Needed (Bed Mobility Goal 1, PT)  independent  -CZ     Time Frame (Bed Mobility Goal 1, PT)  by discharge  -CZ     Strategies/Barriers (Bed Mobility Goal 1, PT)  HOB flat, no bed rails.  -CZ     Progress/Outcomes (Bed Mobility Goal 1, PT)  goal not met  -CZ     Row Name 06/23/21 1028          Transfer Goal 1 (PT)    Activity/Assistive Device (Transfer Goal 1, PT)  sit-to-stand/stand-to-sit;bed-to-chair/chair-to-bed;walker, rolling  -CZ     Time Frame (Transfer Goal 1, PT)  by discharge  -CZ     Strategies/Barriers (Transfers Goal 1, PT)  Encourage use of walker.  -CZ     Row Name 06/23/21 1028          Gait Training Goal 1 (PT)    Activity/Assistive Device (Gait Training Goal 1, PT)  walker, rolling  -CZ     [place] Level (Gait Training Goal 1, PT)  modified independence  -CZ     Distance (Gait Training Goal 1, PT)  150'x2.  -CZ     Time Frame (Gait Training Goal 1, PT)  by discharge  -CZ     Strategies/Barriers (Gait Training Goal 1, PT)  Encourage use of walker.  -CZ     Progress/Outcome (Gait Training Goal 1, PT)  goal not met  -CZ     Row Name 06/23/21 1028          ROM Goal 1 (PT)    ROM Goal 1 (PT)  Increase Tinetti score to 24/28.  -CZ     Time Frame (ROM Goal 1, PT)  by discharge  -CZ     Strategies/Barriers (ROM Goal 1, PT)  Encourage use of walker.  -CZ     Progress/Outcome (ROM Goal 1, PT)  goal not met  -CZ       User Key  (r) = Recorded By, (t) = Taken By, (c) = Cosigned By    Initials Name Provider Type    CZ Uday Fuentes, PT Physical Therapist        Clinical Impression     Row Name 06/23/21 1028          Pain    Additional Documentation  Pain Scale: Numbers Pre/Post-Treatment (Group)  -CZ     [facility] Name 06/23/21 1028          Pain Scale: Numbers Pre/Post-Treatment    Pretreatment Pain Rating  0/10 - no  pain  -CZ     Posttreatment Pain Rating  0/10 - no pain  -CZ     Row Name 06/23/21 1028          Plan of Care Review    Plan of Care Reviewed With  patient  -CZ     Outcome Summary  Initial PT evaluation complete; co-evaluation with OT.  Patient is alert, cooperative, answers orientation questions correctly but is clearly confused and quite impulsive.  She requires CGA with transfers and gait, ambulating 100'x1 with FWW, many cues for proper use of walker.  Tinetti assesed: 19/28 or moderate fall risk; recommend continued use of FWW for ambulation safety.  Patient will need assistance at home if her confusion persists; she would also benefit from HHPT.  Goals established, continue skilled PT.  -CZ     Row Name 06/23/21 1028          Therapy Assessment/Plan (PT)    Criteria for Skilled Interventions Met (PT)  yes;skilled treatment is necessary  -CZ     Row Name 06/23/21 1028          Vital Signs    Pre Systolic BP Rehab  161  -CZ     Pre Treatment Diastolic BP  99  -CZ     Post Systolic BP Rehab  169  -CZ     Post Treatment Diastolic BP  91  -CZ     Pretreatment Heart Rate (beats/min)  79  -CZ     Posttreatment Heart Rate (beats/min)  99  -CZ     Pre SpO2 (%)  96  -CZ     O2 Delivery Pre Treatment  room air  -CZ     Post SpO2 (%)  92  -CZ     Pre Patient Position  Sitting  -CZ     Post Patient Position  Sitting  -CZ     Row Name 06/23/21 1028          Positioning and Restraints    Pre-Treatment Position  sitting in chair/recliner  -CZ     Post Treatment Position  chair  -CZ     In Chair  sitting;call light within reach;encouraged to call for assist;exit alarm on  -CZ       User Key  (r) = Recorded By, (t) = Taken By, (c) = Cosigned By    Initials Name Provider Type    Uday Noland, PT Physical Therapist        Outcome Measures     Row Name 06/23/21 1028          How much help from another person do you currently need...    Turning from your back to your side while in flat bed without using bedrails?  3  -CZ   "   Moving from lying on back to sitting on the side of a flat bed without bedrails?  3  -CZ     Moving to and from a bed to a chair (including a wheelchair)?  3  -CZ     Standing up from a chair using your arms (e.g., wheelchair, bedside chair)?  3  -CZ     Climbing 3-5 steps with a railing?  3  -CZ     To walk in hospital room?  3  -CZ     AM-PAC 6 Clicks Score (PT)  18  -CZ     Row Name 06/23/21 1028          Tinetti Assessment    Tinetti Assessment  yes  -CZ     Sitting Balance  1  -CZ     Arises  1  -CZ     Attempts to Rise  2  -CZ     Immediate Standing Balance (first 5 sec)  1  -CZ     Standing Balance  1  -CZ     Sternal Nudge (feet close together)  0  -CZ     Eyes Closed (feet close together)  1  -CZ     Turning 360 Degrees- Steps  1  -CZ     Turning 360 Degrees- Steadiness  1  -CZ     Sitting Down  1  -CZ     Tinetti Balance Score  10  -CZ     Gait Initiation (immediate after told \"go\")  1  -CZ     Step Length- Right Swing  1  -CZ     Step Length- Left Swing  1  -CZ     Foot Clearance- Right Foot  1  -CZ     Foot Clearance- Left Foot  1  -CZ     Step Symmetry  1  -CZ     Step Continuity  1  -CZ     Path (excursion)  1  -CZ     Trunk  0  -CZ     Base of Support  1  -CZ     Gait Score  9  -CZ     Tinetti Total Score  19  -CZ     Row Name 06/23/21 1029 06/23/21 1028       Functional Assessment    Outcome Measure Options  AM-PAC 6 Clicks Daily Activity (OT)  -AS  AM-PAC 6 Clicks Basic Mobility (PT);Tinetti  -CZ      User Key  (r) = Recorded By, (t) = Taken By, (c) = Cosigned By    Initials Name Provider Type    CZ Uday Fuentes, PT Physical Therapist    AS Amanda Hernandez OT Occupational Therapist        Physical Therapy Education                 Title: PT OT SLP Therapies (In Progress)     Topic: Physical Therapy (In Progress)     Point: Mobility training (Not Started)     Learner Progress:  Not documented in this visit.          Point: Home exercise program (Not Started)     Learner Progress:  Not " documented in this visit.          Point: Body mechanics (Not Started)     Learner Progress:  Not documented in this visit.          Point: Precautions (In Progress)     Learning Progress Summary           Patient Acceptance, E, NR by  at 6/23/2021 1406    Comment: Tinetti assessed: 19/28 or moderate fall risk; recommend use of FWW.                               User Key     Initials Effective Dates Name Provider Type Discipline     06/16/21 -  Udya Fuentes, PT Physical Therapist PT              PT Recommendation and Plan     Plan of Care Reviewed With: patient  Outcome Summary: Initial PT evaluation complete; co-evaluation with OT.  Patient is alert, cooperative, answers orientation questions correctly but is clearly confused and quite impulsive.  She requires CGA with transfers and gait, ambulating 100'x1 with FWW, many cues for proper use of walker.  Tinetti assesed: 19/28 or moderate fall risk; recommend continued use of FWW for ambulation safety.  Patient will need assistance at home if her confusion persists; she would also benefit from HHPT.  Goals established, continue skilled PT.     Time Calculation:   PT Charges     Row Name 06/23/21 1409             Time Calculation    Start Time  1028  -CZ      Stop Time  1058  -CZ      Time Calculation (min)  30 min  -CZ      PT Received On  06/23/21  -CZ      PT Goal Re-Cert Due Date  07/06/21  -CZ         Untimed Charges    PT Eval/Re-eval Minutes  30  -CZ         Total Minutes    Untimed Charges Total Minutes  30  -CZ       Total Minutes  30  -CZ        User Key  (r) = Recorded By, (t) = Taken By, (c) = Cosigned By    Initials Name Provider Type     Uday Fuentes, PT Physical Therapist        Therapy Charges for Today     Code Description Service Date Service Provider Modifiers Qty    [id number] HC PT EVAL MOD COMPLEXITY 2 6/23/2021 Uday Fuentes, PT GP 1          PT G-Codes  Outcome Measure Options: AM-PAC 6 Clicks Daily Activity (OT)  AM-PAC 6  Clicks Score (PT): 18  AM-PAC 6 Clicks Score (OT): 19  Tinetti Total Score: 19    Uday Fuentes, PT  2021      Electronically signed by Uday Fuentes, PT at 21 1412          Occupational Therapy Notes (last 24 hours) (Notes from 21 1528 through 21 1528)      Amanda Hernandez, OT at 21 1337          Patient Name: Dimple Mansfield  : 1940    MRN: [id number]                              Today's Date: 2021       Admit Date: 2021    Visit Dx:     ICD-10-CM ICD-9-CM   1. Hypomagnesemia  E83.42 275.2   2. Confusion  R41.0 298.9   3. Hyponatremia  E87.1 276.1   4. Lung mass  R91.8 786.6   5. Impaired mobility and ADLs  Z74.09 V49.89    Z78.9      Patient Active Problem List   Diagnosis   • Hypomagnesemia   • Severe malnutrition (CMS/HCC)     Past Medical History:   Diagnosis Date   • Diabetes mellitus (CMS/HCC)    • Hypertension      History reviewed. No pertinent surgical history.  General Information     Row Name 21 1029          OT Time and Intention    Document Type  evaluation  -AS     Mode of Treatment  co-treatment;physical therapy;occupational therapy  -AS     Row Name 21 1029          General Information    Patient Profile Reviewed  yes  -AS     Prior Level of Function  independent:;ADL's;all household mobility;transfer;cooking;cleaning;driving;shopping  -AS     Existing Precautions/Restrictions  fall  -AS     Barriers to Rehab  cognitive status  -AS     Row Name 21 1029          Living Environment    Lives With  alone  -AS     Row Name 21 1029          Home Main Entrance    Number of Stairs, Main Entrance  none  -AS     Row Name 21 1029          Stairs Within Home, Primary    Number of Stairs, Within Home, Primary  none  -AS     Stairs Comment, Within Home, Primary  does not have/use any DME; tub/shower combo  -AS     Row Name 21 1029          Cognition    Orientation Status (Cognition)  oriented x 4  -AS     Row Name  06/23/21 1029          Safety Issues, Functional Mobility    Safety Issues Affecting Function (Mobility)  safety precautions follow-through/compliance;safety precaution awareness;impulsivity;judgment;insight into deficits/self-awareness;problem-solving  -AS     Impairments Affecting Function (Mobility)  balance;endurance/activity tolerance;strength  -AS       User Key  (r) = Recorded By, (t) = Taken By, (c) = Cosigned By    Initials Name Provider Type    AS Amanda Hernandez OT Occupational Therapist          Mobility/ADL's     Row Name 06/23/21 1029          Bed Mobility    Comment (Bed Mobility)  rec'd seated up in chair  -AS     Row Name 06/23/21 1029          Transfers    Transfers  sit-stand transfer;toilet transfer  -AS     Comment (Transfers)  max vc for safely managing RW  -AS     Bed-Chair [place] (Transfers)  contact guard  -AS     Sit-Stand [place] (Transfers)  contact guard  -AS     [place] Level (Toilet Transfer)  contact guard  -AS     Assistive Device (Toilet Transfer)  walker, front-wheeled;raised toilet seat  -AS     Row Name 06/23/21 1029          Sit-Stand Transfer    Assistive Device (Sit-Stand Transfers)  walker, front-wheeled  -AS     [facility] Name 06/23/21 1029          Toilet Transfer    Type (Toilet Transfer)  sit-stand;stand-sit  -AS     [facility] Name 06/23/21 1029          Functional Mobility    Functional Mobility- Ind. Level  contact guard assist  -AS     Functional Mobility- Device  rolling walker  -AS     [facility] Name 06/23/21 1029          Activities of Daily Living    BADL Assessment/Intervention  toileting  -AS     Row Name 06/23/21 1029          Toileting Assessment/Training    [place] Level (Toileting)  contact guard assist  -AS     Assistive Devices (Toileting)  raised toilet seat  -AS     Position (Toileting)  unsupported sitting;supported standing  -AS       User Key  (r) = Recorded By, (t) = Taken By, (c) = Cosigned By    Initials Name Provider Type    AS Amanda Hernandez  HANNAH CHILEL Occupational Therapist        Obj/Interventions     Row Name 06/23/21 1029          Sensory Assessment (Somatosensory)    Sensory Assessment (Somatosensory)  UE sensation intact  -AS     Row Name 06/23/21 1029          Range of Motion Comprehensive    General Range of Motion  bilateral upper extremity ROM WFL  -AS     Row Name 06/23/21 1029          Strength Comprehensive (MMT)    Comment, General Manual Muscle Testing (MMT) Assessment  grossly 3+/5 throughout  -AS       User Key  (r) = Recorded By, (t) = Taken By, (c) = Cosigned By    Initials Name Provider Type    AS Amanda Hernandez OT Occupational Therapist        Goals/Plan     Row Name 06/23/21 1029          Transfer Goal 1 (OT)    Activity/Assistive Device (Transfer Goal 1, OT)  sit-to-stand/stand-to-sit;bed-to-chair/chair-to-bed;toilet  -AS     [place] Level/Cues Needed (Transfer Goal 1, OT)  supervision required  -AS     Time Frame (Transfer Goal 1, OT)  long term goal (LTG);by discharge  -AS     Progress/Outcome (Transfer Goal 1, OT)  goal not met  -AS     Row Name 06/23/21 1029          Bathing Goal 1 (OT)    Activity/Device (Bathing Goal 1, OT)  bathing skills, all  -AS     [place] Level/Cues Needed (Bathing Goal 1, OT)  supervision required;set-up required  -AS     Time Frame (Bathing Goal 1, OT)  long term goal (LTG);by discharge  -AS     Progress/Outcomes (Bathing Goal 1, OT)  goal not met  -AS     Row Name 06/23/21 1029          Dressing Goal 1 (OT)    Activity/Device (Dressing Goal 1, OT)  dressing skills, all  -AS     [place]/Cues Needed (Dressing Goal 1, OT)  supervision required;set-up required  -AS     Time Frame (Dressing Goal 1, OT)  long term goal (LTG);by discharge  -AS     Progress/Outcome (Dressing Goal 1, OT)  goal not met  -AS     Row Name 06/23/21 1029          Toileting Goal 1 (OT)    Activity/Device (Toileting Goal 1, OT)  toileting skills, all  -AS     [place] Level/Cues Needed (Toileting Goal 1, OT)   modified independence  -AS     Time Frame (Toileting Goal 1, OT)  long term goal (LTG);by discharge  -AS     Progress/Outcome (Toileting Goal 1, OT)  goal not met  -AS     Row Name 06/23/21 1029          Therapy Assessment/Plan (OT)    Planned Therapy Interventions (OT)  activity tolerance training;functional balance retraining;occupation/activity based interventions;adaptive equipment training;ROM/therapeutic exercise;strengthening exercise;transfer/mobility retraining;BADL retraining;patient/caregiver education/training  -AS       User Key  (r) = Recorded By, (t) = Taken By, (c) = Cosigned By    Initials Name Provider Type    AS Amanda Hernandez, OT Occupational Therapist        Clinical Impression     Row Name 06/23/21 1029          Pain Assessment    Additional Documentation  Pain Scale: Numbers Pre/Post-Treatment (Group)  -AS     Row Name 06/23/21 1029          Pain Scale: Numbers Pre/Post-Treatment    Pretreatment Pain Rating  0/10 - no pain  -AS     Posttreatment Pain Rating  0/10 - no pain  -AS     Row Name 06/23/21 1029          Plan of Care Review    Plan of Care Reviewed With  patient  -AS     Row Name 06/23/21 1029          Therapy Assessment/Plan (OT)    Patient/Family Therapy Goal Statement (OT)  return home  -AS     Rehab Potential (OT)  good, to achieve stated therapy goals  -AS     Criteria for Skilled Therapeutic Interventions Met (OT)  yes;meets criteria;skilled treatment is necessary  -AS     Therapy Frequency (OT)  other (see comments) 5-7days/wk  -AS     Predicted Duration of Therapy Intervention (OT)  until goals met or d/c from facility  -AS     Row Name 06/23/21 1029          Therapy Plan Review/Discharge Plan (OT)    Anticipated Discharge Disposition (OT)  home with assist;home with home health  -AS     Row Name 06/23/21 1029          Vital Signs    Pre Systolic BP Rehab  161  -AS     Pre Treatment Diastolic BP  99  -AS     Post Systolic BP Rehab  169  -AS     Post Treatment Diastolic BP   91  -AS     Pretreatment Heart Rate (beats/min)  79  -AS     Posttreatment Heart Rate (beats/min)  99  -AS     Pre SpO2 (%)  96  -AS     O2 Delivery Pre Treatment  room air  -AS     Post SpO2 (%)  92  -AS     Pre Patient Position  Sitting  -AS     Post Patient Position  Sitting  -AS     Row Name 06/23/21 1029          Positioning and Restraints    Pre-Treatment Position  sitting in chair/recliner  -AS     Post Treatment Position  chair  -AS     In Chair  notified nsg;sitting;call light within reach;encouraged to call for assist;exit alarm on  -AS       User Key  (r) = Recorded By, (t) = Taken By, (c) = Cosigned By    Initials Name Provider Type    AS Amanda Hernandez OT Occupational Therapist        Outcome Measures     Row Name 06/23/21 1029          How much help from another is currently needed...    Putting on and taking off regular lower body clothing?  3  -AS     Bathing (including washing, rinsing, and drying)  3  -AS     Toileting (which includes using toilet bed pan or urinal)  3  -AS     Putting on and taking off regular upper body clothing  3  -AS     Taking care of personal grooming (such as brushing teeth)  3  -AS     Eating meals  4  -AS     AM-PAC 6 Clicks Score (OT)  19  -AS     Row Name 06/23/21 1028          How much help from another person do you currently need...    Turning from your back to your side while in flat bed without using bedrails?  3  -CZ     Moving from lying on back to sitting on the side of a flat bed without bedrails?  3  -CZ     Moving to and from a bed to a chair (including a wheelchair)?  3  -CZ     Standing up from a chair using your arms (e.g., wheelchair, bedside chair)?  3  -CZ     Climbing 3-5 steps with a railing?  3  -CZ     To walk in hospital room?  3  -CZ     AM-PAC 6 Clicks Score (PT)  18  -CZ     Row Name 06/23/21 1029 06/23/21 1028       Functional Assessment    Outcome Measure Options  AM-PAC 6 Clicks Daily Activity (OT)  -AS  AM-PAC 6 Clicks Basic Mobility  (PT);Tinetti  -CZ      User Key  (r) = Recorded By, (t) = Taken By, (c) = Cosigned By    Initials Name Provider Type    CZ Uday Fuentes, PT Physical Therapist    AS Amnada Hernandez, OT Occupational Therapist        Occupational Therapy Education                 Title: PT OT SLP Therapies (In Progress)     Topic: Occupational Therapy (In Progress)     Point: ADL training (In Progress)     Description:   Instruct learner(s) on proper safety adaptation and remediation techniques during self care or transfers.   Instruct in proper use of assistive devices.              Learning Progress Summary           Patient Acceptance, E, NR by AS at 6/23/2021 1327    Comment: role of OT, OT POC, ADL training, transfer training, safety precautions, fall precautions                   Point: Home exercise program (Not Started)     Description:   Instruct learner(s) on appropriate technique for monitoring, assisting and/or progressing therapeutic exercises/activities.              Learner Progress:  Not documented in this visit.          Point: Precautions (In Progress)     Description:   Instruct learner(s) on prescribed precautions during self-care and functional transfers.              Learning Progress Summary           Patient Acceptance, E, NR by AS at 6/23/2021 1327    Comment: role of OT, OT POC, ADL training, transfer training, safety precautions, fall precautions                   Point: Body mechanics (Not Started)     Description:   Instruct learner(s) on proper positioning and spine alignment during self-care, functional mobility activities and/or exercises.              Learner Progress:  Not documented in this visit.                      User Key     Initials Effective Dates Name Provider Type Discipline    AS 03/18/21 -  Amanda Hernandez OT Occupational Therapist OT              OT Recommendation and Plan  Planned Therapy Interventions (OT): activity tolerance training, functional balance retraining,  occupation/activity based interventions, adaptive equipment training, ROM/therapeutic exercise, strengthening exercise, transfer/mobility retraining, BADL retraining, patient/caregiver education/training  Therapy Frequency (OT): other (see comments) (5-7days/wk)  Plan of Care Review  Plan of Care Reviewed With: patient  Outcome Summary: OT eval completed; co-eval with PT. Pt agreeable and cooperative. Pt performed transfers, ambulation, and toileting tasks with CGA. Due to unsteadiness without use of an AD, pt instructed in use of RW. Pt required max vc to safely manage walker during ambulation and functional task. Pt is very impulsive with poor safety awareness. Pt demo decreased short term memory and required frequent repetition of fall precautions and using call bell before getting up. Pt would benefit from continued skilled OT services to increased safety and independence in ADLs. Rec home with assist (due to safety concerns) and HH OT.     Time Calculation:   Time Calculation- OT     Row Name 06/23/21 0766             Time Calculation- OT    OT Start Time  1029  -AS      OT Stop Time  1100  -AS      OT Time Calculation (min)  31 min  -AS      OT Received On  06/23/21  -AS      OT Goal Re-Cert Due Date  07/06/21  -AS         Untimed Charges    OT Eval/Re-eval Minutes  31  -AS         Total Minutes    Untimed Charges Total Minutes  31  -AS       Total Minutes  31  -AS        User Key  (r) = Recorded By, (t) = Taken By, (c) = Cosigned By    Initials Name Provider Type    AS Amanda Hernandez OT Occupational Therapist        Therapy Charges for Today     Code Description Service Date Service Provider Modifiers Qty    36382382721  OT EVAL MOD COMPLEXITY 2 6/23/2021 Amanda Hernandez OT GO 1               Amanda Hernandez OT  6/23/2021    Electronically signed by Amanda Hernandez OT at 06/23/21 1337     Amanda Hernandez OT at 06/23/21 1335        Goal Outcome Evaluation:  Plan of Care Reviewed With: patient             Outcome Summary: OT eval completed; co-eval with PT. Pt agreeable and cooperative. Pt performed transfers, ambulation, and toileting tasks with CGA. Due to unsteadiness without use of an AD, pt instructed in use of RW. Pt required max vc to safely manage walker during ambulation and functional task. Pt is very impulsive with poor safety awareness. Pt demo decreased short term memory and required frequent repetition of fall precautions and using call bell before getting up. Pt would benefit from continued skilled OT services to increase safety and independence in ADLs. Rec home with assist (due to safety concerns) and HH OT.    Electronically signed by Amanda Hernandez, OT at 06/23/21 1908

## 2021-06-23 NOTE — PLAN OF CARE
Goal Outcome Evaluation:  Plan of Care Reviewed With: patient           Outcome Summary: Initial PT evaluation complete; co-evaluation with OT.  Patient is alert, cooperative, answers orientation questions correctly but is clearly confused and quite impulsive.  She requires CGA with transfers and gait, ambulating 100'x1 with FWW, many cues for proper use of walker.  Tinetti assesed: 19/28 or moderate fall risk; recommend continued use of FWW for ambulation safety.  Patient will need assistance at home if her confusion persists; she would also benefit from HHPT.  Goals established, continue skilled PT.

## 2021-06-23 NOTE — PLAN OF CARE
Goal Outcome Evaluation:     This morning patient was restless and uncooperative. Pt became agitated and ativan and morphine were ordered, now resting between care. Pt was made comfort measures today by family. Will continue to monitor.

## 2021-06-24 PROBLEM — C78.00 MALIGNANT NEOPLASM METASTATIC TO LUNG (HCC): Status: ACTIVE | Noted: 2021-01-01

## 2021-06-24 PROBLEM — E87.1 HYPONATREMIA: Status: ACTIVE | Noted: 2021-01-01

## 2021-06-24 PROBLEM — E22.2 SIADH (SYNDROME OF INAPPROPRIATE ADH PRODUCTION) (HCC): Status: ACTIVE | Noted: 2021-01-01

## 2021-06-24 NOTE — PROGRESS NOTES
"Physicians Statement of Medical Necessity for  Ambulance Transportation    GENERAL INFORMATION     Name: Dimple Mansfield  YOB: 1940  Medicare #: [id number]  Transport Date: 6/24/21 (Valid for round trips this date, or for scheduled repetitive trips for 60 days from the date signed below.)  Origin: [facility] room 414  Destination: [facility]. Room 213  Is the Patient's stay covered under Medicare Part A (PPS/DRG?)Yes  Closest appropriate facility? Yes  If this a hosp-hosp transfer? No  Is this a hospice patient? No    MEDICAL NECESSITY QUESTIONAIRE    Ambulance Transportation is medically necessary only if other means of transportation are contraindicated or would be potentially harmful to the patient.  To meet this requirement, the patient must be either \"bed confined\" or suffer from a condition such that transport by means other than an ambulance is contraindicated by the patient's condition.  The following questions must be answered by the healthcare professional signing below for this form to be valid:     1) Describe the MEDICAL CONDITION (physical and/or mental) of this patient AT THE TIME OF AMBULANCE TRANSPORT that requires the patient to be transported in an ambulance, and why transport by other means is contraindicated by the patient's condition: Multiple masses in lung, ovary, and bladder. Likely malignant. Patient is on comfort measures. She is on oxygen at 2L. Unable to tolerate seated position due to weakness.  Past Medical History:   Diagnosis Date   • Diabetes mellitus (CMS/HCC)    • Hypertension       History reviewed. No pertinent surgical history.   2) Is this patient \"bed confined\" as defined below?Yes   To be \"bed confined\" the patient must satisfy all three of the following criteria:  (1) unable to get up from bed without assistance; AND (2) unable to ambulate;  AND (3) unable to sit in a chair or wheelchair.  3) Can this patient safely be " transported by car or wheelchair van (I.e., may safely sit during transport, without an attendant or monitoring?)No   4. In addition to completing questions 1-3 above, please check any of the following conditions that apply*:          *Note: supporting documentation for any boxes checked must be maintained in the patient's medical records Patient is confused, Medical attendant required, Requires oxygen - unable to self administer and Unable to tolerate seated position for time needed to transport      SIGNATURE OF PHYSICIAN OR OTHER AUTHORIZED HEALTHCARE PROFESSIONAL    I certify that the above information is true and correct based on my evaluation of this patient, and represent that the patient requires transport by ambulance and that other forms of transport are contraindicated.  I understand that this information will be used by the Centers for Medicare and Medicaid Services (CMS) to support the determiniation of medical necessity for ambulance services, and I represent that I have personal knowledge of the patient's condition at the time of transport.       If this box is checked, I also certify that the patient is physically or mentally incapable of signing the ambulance service's claim form and that the institution with which I am affiliated has furnished care, services or assistance to the patient.  My signature below is made on behalf of the patient pursuant to 42 .36(b)(4). In accordance with 42 .37, the specific reason(s) that the patient is physically or mentally incapable of signing the claim for is as follows:     Signature of Physician or Healthcare Professional Darlene Sorensen RN, CM   Date/Time:   6/24/21 1106     (For Scheduled repetitive transport, this form is not valid for transports performed more than 60 days after this date).                                                                                                                                             --------------------------------------------------------------------------------------------  Printed Name and Credentials of Physician or Authorized Healthcare Professional     *Form must be signed by patient's attending physician for scheduled, repetitive transports,.  For non-repetitive ambulance transports, if unable to obtain the signature of the attending physician, any of the following may sign (please select below):     Physician  Clinical Nurse Specialist  Registered Nurse     Physician Assistant  Discharge Planner  Licensed Practical Nurse     Nurse Practitioner x

## 2021-06-24 NOTE — DISCHARGE SUMMARY
[facility] Medicine Services  DISCHARGE SUMMARY       Date of Admission: 6/21/2021  Date of Discharge:  6/24/2021  Primary Care Physician: Janice Camara APRN    Presenting Problem/History of Present Illness:  Hypomagnesemia [E83.42]  Confusion [R41.0]  Hyponatremia [E87.1]  Lung mass [R91.8]       Final Discharge Diagnoses:    Malignant neoplasm metastatic to lung (CMS/HCC)    Hypomagnesemia    Severe malnutrition (CMS/HCC)    Hyponatremia    SIADH (syndrome of inappropriate ADH production) (CMS/HCC)      Consults:   Consults     Date and Time Order Name Status Description    6/21/2021 11:28 AM Inpatient Nephrology Consult Completed         Pertinent Test Results:   XR Chest 2 View    Result Date: 6/21/2021  EXAM DESCRIPTION:  XR CHEST 2 VW CLINICAL HISTORY: 81 years  Female  confusion COMPARISON: None available TECHNIQUE: Two views-PA and lateral radiographs of the chest FINDINGS: There are bilateral pulmonary nodules noted the largest on the right measures 3.2 cm in size. There is nodularity in the left infrahilar region with a cluster of nodules noted the largest measuring approximately 4.4 cm in size. There is probable left hilar adenopathy mediastinal adenopathy as well. Further evaluation with CT is strongly recommended. The bony structures are intact. The cardiac silhouette and pulmonary vasculature are within normal limits.     1. Findings as above compatible with pulmonary metastasis with probable left hilar and mediastinal adenopathy. Further evaluation with a contrast-enhanced CT is strongly recommended for more definitive assessment. Electronically signed by:  Brooke Rodriguez MD  6/21/2021 10:54 AM CDT Workstation: [id number]    CT Head Without Contrast    Result Date: 6/21/2021  PROCEDURE: CT head without contrast REASON FOR EXAM: confusion This exam was performed according to our departmental dose-optimization program, which includes automated exposure  control, adjustment of the mA and/or kV according to patient size and/or use of iterative reconstruction technique. FINDINGS: Axial computer tomography sequential imaging of the head was performed from the vertex to the base of the skull. .Sagittal and coronal reformation was performed . The skull vault is intact. Paranasal sinuses and bilateral mastoid air cells are well aerated. Mild to moderate cerebral involutional changes. Bilateral frontal and parietal lobe periventricular deep white matter small foci of hypodensity. Cerebral and cerebellar parenchymal are otherwise normal. Ventricular system and subarachnoid spaces are normal.     1.  No acute intracranial abnormality. 2.  Mild to moderate cerebral involutional changes. 3.  Bilateral frontal and parietal lobe periventricular deep white matter small foci of chronic ischemic gliosis secondary to microvascular disease. Electronically signed by:  Allen Franco MD  6/21/2021 11:02 AM CDT Workstation: [id number]    CT Chest With Contrast Diagnostic    Result Date: 6/21/2021  EXAM: CT CHEST WITH IV CONTRAST COMPARISON: Chest radiograph dated same day INDICATION: lung mass, E83.42 Hypomagnesemia R41.0 Disorientation, unspecified E87.1 Hypo-osmolality and hyponatremia R91.8 Other nonspecific abnormal finding of lung field TECHNIQUE: CT images were obtained of the chest after the uneventful administration of iodinated intravenous contrast. Reformats were provided. All CT scans at this facility uses dose modulation, iterative reconstruction, and/or weight-based dosing when appropriate to achieve a radiation dose as low as reasonably achievable. FINDINGS: Lobular right apical lung mass with few spiculated margins that measures 3.0 x 2.1 x 2.9 cm. Within the left upper mediastinum and hilum, there is a large lobular mass that measures 8.6 x 4.8 cm on coronal image 27/80 and up to 9.1 cm in AP dimension on axial image 28/64. The mass completely encases the left upper  pulmonary artery. There is narrowing of the left basilar pulmonary artery. In the left infrahilar region, there is a 4.9 x 4.2 x 4.5 cm mass (coronal image 32/80 and axial image 31/64). Additional part solid nodule is seen in the left apex that measures 2.0 x 2.2 cm best seen on coronal image 41/80. There are multiple additional spiculated nodules in the left upper lobe that measure 2.1 x 0.8 cm (axial image 18/64), 1.1 x 0.9 cm (image 20/64), 1.4 x 0.7 cm (image 21/64), and 5 x 6 mm (21/64). The distal esophagus demonstrates circumferential thickening with appearance of intraluminal mass (axial image 42/64). Heart size is normal. There is coronary artery disease. No pericardial effusion. Cholelithiasis. Bilateral subcentimeter renal hypodensities are too small to characterize, statistically likely cysts. No acute osseous abnormality. Spondylosis and degenerative disc disease of the visualized spine.     Large left mediastinal and hilar mass encasing the left superior basilar pulmonary arteries. Additional right apical lung mass and multiple suspicious nodules in the left upper lobe. Appearance of intraluminal mass in the distal esophagus. Above findings may represent primary and/or metastatic neoplasm such as lung or esophageal cancer, or lymphoma. Coronary artery disease. Cholelithiasis. Electronically signed by:  Thad Coleman MD  6/21/2021 3:30 PM CDT Workstation: [id number]    CT Abdomen Pelvis With Contrast    Result Date: 6/22/2021  PROCEDURE: Ct abdomen and pelvis with contrast REASON FOR EXAM: Adnexal mass, malignancy suspected, E83.42 Hypomagnesemia R41.0 Disorientation, unspecified E87.1 Hypo-osmolality and hyponatremia R91.8 Other nonspecific abnormal finding of lung field FINDINGS: Comparison study dated  June 21, 2021. Axial computer tomography sequential imaging was performed from the diaphragms through the symphysis pubis with IV contrast administration. Sagittal and coronal reformates was  performed. This exam was performed according to our departmental dose optimization program, which includes automated exposure control, adjustment of the mA and/or KV according to patient size and/or use of iterative reconstruction technique. Imaging through the lung bases reveals mild edematous thickening of the distal anterior esophageal walls. The lung bases are otherwise unremarkable. The liver is normal. Multiple varying sized gallstones within the gallbladder. The gallbladder is otherwise unremarkable. The biliary system is normal. The pancreas is normal. The spleen is normal. Mild enlargement of bilateral adrenal glands with irregular borders. The right kidney and ureter are normal. The left kidney and ureter are normal. Abnormal appearance of the bladder with diffuse hyperdense material filling the bladder which has Hounsfield units of 71. Right ovarian enlarged enhancing mass lesion which becomes indistinct with the posterior uterus. This mass measures 5.06 x 6.47 x 4.71 cm. The uterus and left ovary appear within normal limits. The hollow viscera is normal. No lymphadenopathy in the abdomen or the pelvis. No acute osseous abnormality.     1. Distal esophageal mild edematous anterior soft tissue wall thickening. The differential for this finding would include changes from esophagitis versus neoplastic involvement. 2. Cholelithiasis. 3.Mild enlargement of bilateral adrenal glands with irregular borders. The differential for this finding would include metastatic disease versus atypical appearing adrenal adenomas. Recommend clinical correlation. 4.Abnormal appearance of the bladder with diffuse hyperdense material filling the bladder which has Hounsfield units of 71. The differential for this finding would include hemorrhage within the bladder lumen versus mass. 5.Right ovarian enlarged enhancing mass lesion which becomes indistinct with the posterior uterus. This mass measures 5.06 x 6.47 x 4.71 cm. This  "lesion would be suspicious for primary ovarian neoplasm versus metastatic disease possibly invading into the uterus. Recommend clinical correlation. 6. Remainder CT abdomen pelvis study with contrast unremarkable. Electronically signed by:  Allen Franco MD  6/22/2021 12:56 PM CDT Workstation: [id number]      HPI/Hospital Course:  The patient is a 81 y.o. female with a history of HTN and DMII who [facility] decline and was found to have sodium of 119.  Imaging revealed esophageal thickening, bilateral adrenal gland enlargement, abnormal appearance of the bladder, right ovarian mass, and multiple lung masses.   Nephrology and oncology consulted.  She was not responsive to isotonic saline and was initiated on 3% saline.  Options were discussed with patient and family and the decision was made for comfort measures. She is discharged to SNF on comfort measures.    Condition on Discharge:   Comfort measures only    Physical Exam on Discharge:  /65 (BP Location: Left arm, Patient Position: Lying)   Pulse 76   Temp 97 °F (36.1 °C) (Axillary)   Resp 16   Ht 165.1 cm (65\")   Wt 58 kg (127 lb 12.8 oz)   SpO2 99%   BMI 21.27 kg/m²   Physical Exam  Vitals reviewed.   Constitutional:       Appearance: Normal appearance.   HENT:      Head: Normocephalic and atraumatic.   Cardiovascular:      Rate and Rhythm: Normal rate and regular rhythm.   Pulmonary:      Effort: Accessory muscle usage present. No respiratory distress.      Breath sounds: Decreased breath sounds present.   Abdominal:      General: There is no distension.      Palpations: Abdomen is soft.      Tenderness: There is no abdominal tenderness.   Musculoskeletal:         General: No swelling or deformity.   Skin:     Coloration: Skin is not jaundiced.      Findings: No bruising.   Neurological:      Mental Status: She is lethargic.           Discharge Disposition:  Skilled Nursing Facility (DC - " External)    Discharge Medications:     Discharge Medications      New Medications      Instructions Start Date   LORazepam 2 MG/ML concentrated solution  Commonly known as: ATIVAN   1 mg, Oral, Every 2 Hours PRN      morphine 20 MG/ML concentrated solution   5 mg, Oral, Every 2 Hours PRN         Stop These Medications    amLODIPine 10 MG tablet  Commonly known as: NORVASC     lisinopril 40 MG tablet  Commonly known as: PRINIVIL,ZESTRIL     meclizine 25 MG tablet  Commonly known as: ANTIVERT     metFORMIN 500 MG tablet  Commonly known as: GLUCOPHAGE            Discharge Diet:   Diet Instructions     Diet: Regular      Discharge Diet: Regular          Activity at Discharge:   Activity Instructions     Activity as Tolerated          Follow-up Appointments:   Provider at [facility]      ROSA Huddleston  06/24/21  12:29 CDT    Time: Discharge planning and teaching took greater than 30 minutes.

## 2021-06-24 NOTE — DISCHARGE PLACEMENT REQUEST
"Dimple Mansfield (81 y.o. Female)     Date of Birth Social Security Number Address Home Phone MRN    1940  [id number] [facility] [address] [phone] [id number]    Hinduism Marital Status          Non-Religion        Admission Date Admission Type Admitting Provider Attending Provider Department, Room/Bed    6/21/21 Emergency Cash Carlos MD Echendu, Anthony W, MD [address], 414/1    Discharge Date Discharge Disposition Discharge Destination         Skilled Nursing Facility (DC - External)              Attending Provider: Cash Carlos MD    Allergies: No Known Allergies    Isolation: None   Infection: None   Code Status: No CPR    Ht: 165.1 cm (65\")   Wt: 58 kg (127 lb 12.8 oz)    Admission Cmt: None   Principal Problem: None                Active Insurance as of 6/21/2021     Primary Coverage     Payor Plan Insurance Group Employer/Plan Group    MEDICARE MEDICARE A & B      Payor Plan Address Payor Plan Phone Number Payor Plan Fax Number Effective Dates    [address] [phone]  6/1/2004 - None Entered    [facility] [id number]       Subscriber Name Subscriber Birth Date Member ID       DIMPLE MANSFIELD 1940 [id number]           Secondary Coverage     Payor Plan Insurance Group Employer/Plan Group    [id number]      Payor Plan Address Payor Plan Phone Number Payor Plan Fax Number Effective Dates    [address]   6/1/2021 - None Entered    [place] [address]       Subscriber Name Subscriber Birth Date Member ID       DIMPLE MANSFIELD 1940 [id number]                 Emergency Contacts      (Rel.) Home Phone Work Phone Mobile Phone    Brooks Mansfield (Son) [phone] -- [phone]              "

## 2021-06-24 NOTE — PLAN OF CARE
Goal Outcome Evaluation:           Progress: no change  Outcome Summary: vss. pain controlled with prn morphine and ativan. comfort measures in place. pt resting in between care. will continue to monitor.  Problem: Diabetes Comorbidity  Goal: Blood Glucose Level Within Desired Range  Outcome: Unable to Meet, Plan Revised     Problem: Hypertension Comorbidity  Goal: Blood Pressure in Desired Range  Outcome: Unable to Meet, Plan Revised     Problem: Electrolyte Imbalance  Goal: Electrolyte Balance  Outcome: Unable to Meet, Plan Revised